# Patient Record
Sex: MALE | Race: WHITE | Employment: OTHER | URBAN - METROPOLITAN AREA
[De-identification: names, ages, dates, MRNs, and addresses within clinical notes are randomized per-mention and may not be internally consistent; named-entity substitution may affect disease eponyms.]

---

## 2017-08-17 DIAGNOSIS — Z12.5 PROSTATE CANCER SCREENING: Primary | ICD-10-CM

## 2017-09-08 ENCOUNTER — HOSPITAL ENCOUNTER (OUTPATIENT)
Age: 67
Discharge: HOME | End: 2017-09-08
Payer: MEDICARE

## 2017-09-08 DIAGNOSIS — Z12.5 PROSTATE CANCER SCREENING: ICD-10-CM

## 2017-09-08 LAB — PROSTATE SPECIFIC ANTIGEN: 0.8 UG/L

## 2017-09-08 PROCEDURE — G0103 PSA SCREENING: HCPCS

## 2017-09-08 PROCEDURE — 36415 COLL VENOUS BLD VENIPUNCTURE: CPT

## 2017-09-28 ENCOUNTER — OFFICE VISIT (OUTPATIENT)
Dept: UROLOGY | Age: 67
End: 2017-09-28
Payer: COMMERCIAL

## 2017-09-28 VITALS
DIASTOLIC BLOOD PRESSURE: 83 MMHG | HEART RATE: 59 BPM | TEMPERATURE: 97.7 F | BODY MASS INDEX: 27.76 KG/M2 | SYSTOLIC BLOOD PRESSURE: 149 MMHG | HEIGHT: 69 IN | WEIGHT: 187.4 LBS

## 2017-09-28 DIAGNOSIS — R35.1 NOCTURIA: ICD-10-CM

## 2017-09-28 DIAGNOSIS — N40.1 BENIGN NON-NODULAR PROSTATIC HYPERPLASIA WITH LOWER URINARY TRACT SYMPTOMS: Primary | ICD-10-CM

## 2017-09-28 PROCEDURE — 99213 OFFICE O/P EST LOW 20 MIN: CPT | Performed by: UROLOGY

## 2017-09-28 ASSESSMENT — ENCOUNTER SYMPTOMS
BACK PAIN: 0
WHEEZING: 0
VOMITING: 0
COLOR CHANGE: 0
NAUSEA: 0
EYE REDNESS: 0
EYE PAIN: 0
SHORTNESS OF BREATH: 0
ABDOMINAL PAIN: 0
COUGH: 0

## 2018-06-01 DIAGNOSIS — Z12.11 ENCOUNTER FOR SCREENING COLONOSCOPY: Primary | ICD-10-CM

## 2018-06-15 DIAGNOSIS — Z86.010 HISTORY OF COLON POLYPS: Primary | ICD-10-CM

## 2018-06-19 RX ORDER — POLYETHYLENE GLYCOL 3350 17 G/17G
POWDER, FOR SOLUTION ORAL
Qty: 255 G | Refills: 0 | Status: SHIPPED | OUTPATIENT
Start: 2018-06-19 | End: 2018-07-15

## 2018-06-27 ENCOUNTER — HOSPITAL ENCOUNTER (OUTPATIENT)
Age: 68
Setting detail: OUTPATIENT SURGERY
Discharge: HOME | End: 2018-06-27
Attending: INTERNAL MEDICINE | Admitting: INTERNAL MEDICINE
Payer: MEDICARE

## 2018-06-27 VITALS
HEART RATE: 58 BPM | TEMPERATURE: 98.1 F | OXYGEN SATURATION: 94 % | BODY MASS INDEX: 28.14 KG/M2 | RESPIRATION RATE: 14 BRPM | DIASTOLIC BLOOD PRESSURE: 65 MMHG | HEIGHT: 69 IN | WEIGHT: 190 LBS | SYSTOLIC BLOOD PRESSURE: 129 MMHG

## 2018-06-27 PROCEDURE — 7100000011 HC PHASE II RECOVERY - ADDTL 15 MIN: Performed by: INTERNAL MEDICINE

## 2018-06-27 PROCEDURE — 99152 MOD SED SAME PHYS/QHP 5/>YRS: CPT | Performed by: INTERNAL MEDICINE

## 2018-06-27 PROCEDURE — 6360000002 HC RX W HCPCS: Performed by: INTERNAL MEDICINE

## 2018-06-27 PROCEDURE — 3609010400 HC COLONOSCOPY POLYPECTOMY HOT BIOPSY: Performed by: INTERNAL MEDICINE

## 2018-06-27 PROCEDURE — 99153 MOD SED SAME PHYS/QHP EA: CPT | Performed by: INTERNAL MEDICINE

## 2018-06-27 PROCEDURE — 88305 TISSUE EXAM BY PATHOLOGIST: CPT

## 2018-06-27 PROCEDURE — 2580000003 HC RX 258: Performed by: INTERNAL MEDICINE

## 2018-06-27 PROCEDURE — 7100000010 HC PHASE II RECOVERY - FIRST 15 MIN: Performed by: INTERNAL MEDICINE

## 2018-06-27 RX ORDER — FENTANYL CITRATE 50 UG/ML
INJECTION, SOLUTION INTRAMUSCULAR; INTRAVENOUS PRN
Status: DISCONTINUED | OUTPATIENT
Start: 2018-06-27 | End: 2018-06-27 | Stop reason: HOSPADM

## 2018-06-27 RX ORDER — MIDAZOLAM HYDROCHLORIDE 1 MG/ML
INJECTION INTRAMUSCULAR; INTRAVENOUS PRN
Status: DISCONTINUED | OUTPATIENT
Start: 2018-06-27 | End: 2018-06-27 | Stop reason: HOSPADM

## 2018-06-27 RX ORDER — SODIUM CHLORIDE 9 MG/ML
INJECTION, SOLUTION INTRAVENOUS CONTINUOUS
Status: DISCONTINUED | OUTPATIENT
Start: 2018-06-27 | End: 2018-06-27 | Stop reason: HOSPADM

## 2018-06-27 RX ADMIN — SODIUM CHLORIDE: 9 INJECTION, SOLUTION INTRAVENOUS at 06:40

## 2018-06-27 ASSESSMENT — PAIN - FUNCTIONAL ASSESSMENT: PAIN_FUNCTIONAL_ASSESSMENT: 0-10

## 2018-06-27 ASSESSMENT — PAIN SCALES - GENERAL: PAINLEVEL_OUTOF10: 0

## 2018-06-28 LAB — SURGICAL PATHOLOGY REPORT: NORMAL

## 2018-07-12 ENCOUNTER — TELEPHONE (OUTPATIENT)
Dept: GASTROENTEROLOGY | Age: 68
End: 2018-07-12

## 2018-09-10 ENCOUNTER — HOSPITAL ENCOUNTER (OUTPATIENT)
Age: 68
Discharge: HOME | End: 2018-09-10
Payer: MEDICARE

## 2018-09-10 DIAGNOSIS — N40.1 BENIGN NON-NODULAR PROSTATIC HYPERPLASIA WITH LOWER URINARY TRACT SYMPTOMS: ICD-10-CM

## 2018-09-10 LAB — PROSTATE SPECIFIC ANTIGEN: 0.89 UG/L

## 2018-09-10 PROCEDURE — 84153 ASSAY OF PSA TOTAL: CPT

## 2018-09-10 PROCEDURE — 36415 COLL VENOUS BLD VENIPUNCTURE: CPT

## 2018-09-27 ENCOUNTER — OFFICE VISIT (OUTPATIENT)
Dept: UROLOGY | Age: 68
End: 2018-09-27
Payer: MEDICARE

## 2018-09-27 VITALS
HEIGHT: 69 IN | TEMPERATURE: 97.6 F | DIASTOLIC BLOOD PRESSURE: 80 MMHG | BODY MASS INDEX: 27.25 KG/M2 | HEART RATE: 52 BPM | WEIGHT: 184 LBS | SYSTOLIC BLOOD PRESSURE: 152 MMHG

## 2018-09-27 DIAGNOSIS — R35.1 NOCTURIA: ICD-10-CM

## 2018-09-27 DIAGNOSIS — N40.1 BENIGN PROSTATIC HYPERPLASIA WITH WEAK URINARY STREAM: Primary | ICD-10-CM

## 2018-09-27 DIAGNOSIS — R39.12 BENIGN PROSTATIC HYPERPLASIA WITH WEAK URINARY STREAM: Primary | ICD-10-CM

## 2018-09-27 PROCEDURE — 99214 OFFICE O/P EST MOD 30 MIN: CPT | Performed by: UROLOGY

## 2018-09-27 ASSESSMENT — ENCOUNTER SYMPTOMS
EYE REDNESS: 0
EYE PAIN: 0
ABDOMINAL PAIN: 0
COUGH: 0
COLOR CHANGE: 1
VOMITING: 0
NAUSEA: 0
SHORTNESS OF BREATH: 0
WHEEZING: 0
BACK PAIN: 0

## 2018-09-27 NOTE — PROGRESS NOTES
condition?: Mostly Satisfied    Last BUN and creatinine:  Lab Results   Component Value Date    BUN 17 11/04/2016     Lab Results   Component Value Date    CREATININE 0.84 11/04/2016       Additional Lab/Culture results: none    Imaging Reviewed during this Office Visit: none  (results were independently reviewed by physician and radiology report verified)    PAST MEDICAL, FAMILY AND SOCIAL HISTORY UPDATE:  Past Medical History:   Diagnosis Date    Hyperlipidemia     Vitiligo      Past Surgical History:   Procedure Laterality Date    COLONOSCOPY      COLONOSCOPY N/A 6/27/2018    COLONOSCOPY POLYPECTOMY HOT performed by Liliana Escobar MD at [address] [place] [address]      PRE-MALIGNANT / BENIGN SKIN LESION EXCISION       Family History   Problem Relation Age of Onset    Prostate Cancer Father      Outpatient Prescriptions Marked as Taking for the 9/27/18 encounter (Office Visit) with Kenan Resendiz MD   Medication Sig Dispense Refill    atorvastatin (LIPITOR) 40 MG tablet TAKE 1 TABLET BY MOUTH DAILY 90 tablet 3       Tetanus toxoids  History   Smoking Status    Former Smoker    Types: Cigarettes   Smokeless Tobacco    Never Used     (If patient a smoker, smoking cessation counseling offered)    History   Alcohol Use    0.0 oz/week     Comment: 12 pack 2 days a week       REVIEW OF SYSTEMS:  Review of Systems    Physical Exam:      Vitals:    09/27/18 1008   BP: (!) 152/80   Pulse:    Temp:      Body mass index is 27.17 kg/m². Patient is a 75 y.o. male in no acute distress and alert and oriented to person, place and time. Physical Exam  Constitutional: Patient in no acute distress. Neuro: Alert and oriented to person, place and time.   Psych: Mood normal, affect normal  Skin: No rash noted  HEENT: Head: Normocephalic and atraumatic  Conjunctivae and EOM are normal. Pupils are equal, round  Nose: Normal  Right External Ear: Normal; Left External Ear: Normal  Mouth: Mucosa

## 2018-10-22 ENCOUNTER — TELEPHONE (OUTPATIENT)
Dept: PHARMACY | Facility: CLINIC | Age: 68
End: 2018-10-22

## 2019-02-28 ENCOUNTER — TELEPHONE (OUTPATIENT)
Dept: PHARMACY | Facility: CLINIC | Age: 69
End: 2019-02-28

## 2019-03-11 ENCOUNTER — OFFICE VISIT (OUTPATIENT)
Dept: GASTROENTEROLOGY | Age: 69
End: 2019-03-11
Payer: MEDICARE

## 2019-03-11 VITALS
HEIGHT: 69 IN | BODY MASS INDEX: 27.99 KG/M2 | HEART RATE: 64 BPM | WEIGHT: 189 LBS | DIASTOLIC BLOOD PRESSURE: 82 MMHG | SYSTOLIC BLOOD PRESSURE: 141 MMHG

## 2019-03-11 DIAGNOSIS — R14.3 FLATUS: Primary | ICD-10-CM

## 2019-03-11 PROCEDURE — 99214 OFFICE O/P EST MOD 30 MIN: CPT | Performed by: INTERNAL MEDICINE

## 2019-03-11 ASSESSMENT — ENCOUNTER SYMPTOMS
BLOOD IN STOOL: 0
ANAL BLEEDING: 1
SINUS PAIN: 0
DIARRHEA: 0
SINUS PRESSURE: 0
CHEST TIGHTNESS: 0
WHEEZING: 0
EYE REDNESS: 0
ABDOMINAL DISTENTION: 0
VOMITING: 0
ABDOMINAL PAIN: 0
EYE PAIN: 0
CONSTIPATION: 0
NAUSEA: 0
TROUBLE SWALLOWING: 0
COUGH: 0
RECTAL PAIN: 0
SHORTNESS OF BREATH: 1
BACK PAIN: 0

## 2019-07-22 PROBLEM — L80 VITILIGO: Status: ACTIVE | Noted: 2019-07-22

## 2019-07-24 ENCOUNTER — HOSPITAL ENCOUNTER (OUTPATIENT)
Age: 69
Discharge: HOME | End: 2019-07-24
Payer: MEDICARE

## 2019-07-24 DIAGNOSIS — R04.0 EPISTAXIS: ICD-10-CM

## 2019-07-24 LAB
ABSOLUTE EOS #: 0.2 K/UL (ref 0–0.4)
ABSOLUTE IMMATURE GRANULOCYTE: ABNORMAL K/UL (ref 0–0.3)
ABSOLUTE LYMPH #: 1.7 K/UL (ref 1–4.8)
ABSOLUTE MONO #: 0.8 K/UL (ref 0.1–1.3)
ALBUMIN SERPL-MCNC: 4.3 G/DL (ref 3.5–5.2)
ALBUMIN/GLOBULIN RATIO: ABNORMAL (ref 1–2.5)
ALP BLD-CCNC: 72 U/L (ref 40–129)
ALT SERPL-CCNC: 32 U/L (ref 5–41)
ANION GAP SERPL CALCULATED.3IONS-SCNC: 12 MMOL/L (ref 9–17)
AST SERPL-CCNC: 23 U/L
BASOPHILS # BLD: 1 % (ref 0–2)
BASOPHILS ABSOLUTE: 0.1 K/UL (ref 0–0.2)
BILIRUB SERPL-MCNC: 0.63 MG/DL (ref 0.3–1.2)
BUN BLDV-MCNC: 20 MG/DL (ref 8–23)
BUN/CREAT BLD: ABNORMAL (ref 9–20)
CALCIUM SERPL-MCNC: 9.1 MG/DL (ref 8.6–10.4)
CHLORIDE BLD-SCNC: 102 MMOL/L (ref 98–107)
CO2: 24 MMOL/L (ref 20–31)
CREAT SERPL-MCNC: 0.71 MG/DL (ref 0.7–1.2)
DIFFERENTIAL TYPE: ABNORMAL
EOSINOPHILS RELATIVE PERCENT: 3 % (ref 0–4)
GFR AFRICAN AMERICAN: >60 ML/MIN
GFR NON-AFRICAN AMERICAN: >60 ML/MIN
GFR SERPL CREATININE-BSD FRML MDRD: ABNORMAL ML/MIN/{1.73_M2}
GFR SERPL CREATININE-BSD FRML MDRD: ABNORMAL ML/MIN/{1.73_M2}
GLUCOSE BLD-MCNC: 102 MG/DL (ref 70–99)
HCT VFR BLD CALC: 47.1 % (ref 41–53)
HEMOGLOBIN: 16 G/DL (ref 13.5–17.5)
IMMATURE GRANULOCYTES: ABNORMAL %
LYMPHOCYTES # BLD: 24 % (ref 24–44)
MCH RBC QN AUTO: 31.4 PG (ref 26–34)
MCHC RBC AUTO-ENTMCNC: 33.9 G/DL (ref 31–37)
MCV RBC AUTO: 92.5 FL (ref 80–100)
MONOCYTES # BLD: 12 % (ref 1–7)
NRBC AUTOMATED: ABNORMAL PER 100 WBC
PDW BLD-RTO: 14 % (ref 11.5–14.9)
PLATELET # BLD: 174 K/UL (ref 150–450)
PLATELET ESTIMATE: ABNORMAL
PMV BLD AUTO: 7.7 FL (ref 6–12)
POTASSIUM SERPL-SCNC: 4.2 MMOL/L (ref 3.7–5.3)
RBC # BLD: 5.09 M/UL (ref 4.5–5.9)
RBC # BLD: ABNORMAL 10*6/UL
SEG NEUTROPHILS: 60 % (ref 36–66)
SEGMENTED NEUTROPHILS ABSOLUTE COUNT: 4.3 K/UL (ref 1.3–9.1)
SODIUM BLD-SCNC: 138 MMOL/L (ref 135–144)
TOTAL PROTEIN: 7.3 G/DL (ref 6.4–8.3)
WBC # BLD: 7.1 K/UL (ref 3.5–11)
WBC # BLD: ABNORMAL 10*3/UL

## 2019-07-24 PROCEDURE — 80053 COMPREHEN METABOLIC PANEL: CPT

## 2019-07-24 PROCEDURE — 85025 COMPLETE CBC W/AUTO DIFF WBC: CPT

## 2019-07-24 PROCEDURE — 36415 COLL VENOUS BLD VENIPUNCTURE: CPT

## 2019-08-30 ENCOUNTER — HOSPITAL ENCOUNTER (OUTPATIENT)
Age: 69
Discharge: HOME | End: 2019-08-30
Payer: MEDICARE

## 2019-08-30 DIAGNOSIS — R39.12 BENIGN PROSTATIC HYPERPLASIA WITH WEAK URINARY STREAM: ICD-10-CM

## 2019-08-30 DIAGNOSIS — N40.1 BENIGN PROSTATIC HYPERPLASIA WITH WEAK URINARY STREAM: ICD-10-CM

## 2019-08-30 LAB — PROSTATE SPECIFIC ANTIGEN: 0.79 UG/L

## 2019-08-30 PROCEDURE — 84153 ASSAY OF PSA TOTAL: CPT

## 2019-08-30 PROCEDURE — 36415 COLL VENOUS BLD VENIPUNCTURE: CPT

## 2019-09-05 ENCOUNTER — OFFICE VISIT (OUTPATIENT)
Dept: UROLOGY | Age: 69
End: 2019-09-05
Payer: MEDICARE

## 2019-09-05 VITALS
HEIGHT: 69 IN | HEART RATE: 54 BPM | SYSTOLIC BLOOD PRESSURE: 138 MMHG | DIASTOLIC BLOOD PRESSURE: 81 MMHG | BODY MASS INDEX: 28.02 KG/M2 | TEMPERATURE: 97.4 F | WEIGHT: 189.15 LBS

## 2019-09-05 DIAGNOSIS — N40.1 HYPERTROPHY OF PROSTATE WITH URINARY OBSTRUCTION: Primary | ICD-10-CM

## 2019-09-05 DIAGNOSIS — R35.0 URINARY FREQUENCY: ICD-10-CM

## 2019-09-05 DIAGNOSIS — N39.41 URGE INCONTINENCE OF URINE: ICD-10-CM

## 2019-09-05 DIAGNOSIS — R39.15 URGENCY OF URINATION: ICD-10-CM

## 2019-09-05 DIAGNOSIS — N13.8 HYPERTROPHY OF PROSTATE WITH URINARY OBSTRUCTION: Primary | ICD-10-CM

## 2019-09-05 PROCEDURE — 99214 OFFICE O/P EST MOD 30 MIN: CPT | Performed by: UROLOGY

## 2019-09-05 RX ORDER — TAMSULOSIN HYDROCHLORIDE 0.4 MG/1
0.4 CAPSULE ORAL DAILY
Qty: 90 CAPSULE | Refills: 3 | Status: SHIPPED | OUTPATIENT
Start: 2019-09-05 | End: 2019-12-16 | Stop reason: SDUPTHER

## 2019-09-05 NOTE — PROGRESS NOTES
[facility]  [facility] - [facility] [address] [address]  Dept: [address] [facility] Urology Office Note - Established    Patient:  Ricardo Aguila  YOB: 1950  Date: 2019    The patient is a 70 y.o. male who presents todayfor evaluation of the following problems:   Chief Complaint   Patient presents with    Other     1 year with PSA        HPI  Here for follow up on BPH. He is currently not taking any medications. He notices his stream is weak, has urgency, he has no nocturia. His Dad  of prostate cancer, and his Paternal Bernie Win also  of prostate cancer. He drinks alcohol routinely- he is using depends if he plans to drink a lot. Summary of old records: N/A    Additional History: N/A    Procedures Today: N/A    Urinalysis today:  No results found for this visit on 19.   Last several PSA's:  Lab Results   Component Value Date    PSA 0.79 2019    PSA 0.89 09/10/2018    PSA 0.80 2017     Last total testosterone:  Lab Results   Component Value Date    TESTOSTERONE 367 2013       AUA Symptom Score (2019):  INCOMPLETE EMPTYING: How often have you had the sensation of not emptying your bladder?: About Half the time  FREQUENCY: How often do you have to urinate less than every two hours?: Not at all  INTERMITTENCY: How often have you found you stopped and started again several times when you urinated?: Not at all  URGENCY: How often have you found it difficult to postpone urination?: Less than Half the time  WEAK STREAM: How often have you had a weak urinary stream?: Less than 1 to 5 times  STRAINING: How often have you had to strain to start  urination?: Not at all  NOCTURIA: How many times did you typically get up at night to uriniate?: NONE  TOTAL I-PSS SCORE[de-identified] 6  How would you feel if you were to spend the rest of your life with your urinary condition?: Delighted    Last BUN and creatinine:  Lab Results normal  Skin: No rash noted  HEENT: Head: Normocephalic andatraumatic  Conjunctivae and EOM are normal. Pupils are equal, round  Nose:Normal  Right External Ear: Normal; Left External Ear: Normal  Mouth: Mucosa Moist  Neck: Supple  Lungs: Respiratory effort is normal  Cardiovascular: Warm & Pink  Abdomen: Soft, non-tender, non-distended with no CVA,  No flank tenderness,  Or hepatosplenomegaly       Assessment and Plan      1. Hypertrophy of prostate with urinary obstruction    2. Urgency of urination    3. Urinary frequency    4. Urge incontinence of urine           Plan:     trial of flomax  Return in about 3 months (around 12/5/2019) for Follow up. Prescriptions Ordered:  Orders Placed This Encounter   Medications    tamsulosin (FLOMAX) 0.4 MG capsule     Sig: Take 1 capsule by mouth daily     Dispense:  90 capsule     Refill:  3     Orders Placed:  No orders of the defined types were placed in this encounter. Fabiano Deluca MD    Agree with the ROS entered by the MA.

## 2019-12-05 ENCOUNTER — OFFICE VISIT (OUTPATIENT)
Dept: UROLOGY | Age: 69
End: 2019-12-05
Payer: MEDICARE

## 2019-12-05 VITALS
WEIGHT: 203 LBS | BODY MASS INDEX: 30.07 KG/M2 | HEART RATE: 60 BPM | HEIGHT: 69 IN | DIASTOLIC BLOOD PRESSURE: 62 MMHG | SYSTOLIC BLOOD PRESSURE: 146 MMHG

## 2019-12-05 DIAGNOSIS — R39.14 BENIGN PROSTATIC HYPERPLASIA WITH INCOMPLETE BLADDER EMPTYING: Primary | ICD-10-CM

## 2019-12-05 DIAGNOSIS — N32.81 OAB (OVERACTIVE BLADDER): ICD-10-CM

## 2019-12-05 DIAGNOSIS — N40.1 BENIGN PROSTATIC HYPERPLASIA WITH INCOMPLETE BLADDER EMPTYING: Primary | ICD-10-CM

## 2019-12-05 PROCEDURE — 99213 OFFICE O/P EST LOW 20 MIN: CPT | Performed by: UROLOGY

## 2019-12-05 ASSESSMENT — ENCOUNTER SYMPTOMS
ABDOMINAL PAIN: 0
COUGH: 0
EYE REDNESS: 0
BACK PAIN: 0
CONSTIPATION: 0
VOMITING: 0
WHEEZING: 0
SHORTNESS OF BREATH: 0
EYE PAIN: 0
NAUSEA: 0
DIARRHEA: 0

## 2019-12-16 RX ORDER — TAMSULOSIN HYDROCHLORIDE 0.4 MG/1
0.4 CAPSULE ORAL DAILY
Qty: 90 CAPSULE | Refills: 3 | Status: SHIPPED | OUTPATIENT
Start: 2019-12-16 | End: 2020-06-01 | Stop reason: SDUPTHER

## 2020-02-24 ENCOUNTER — TELEPHONE (OUTPATIENT)
Dept: ONCOLOGY | Age: 70
End: 2020-02-24

## 2020-02-29 ENCOUNTER — HOSPITAL ENCOUNTER (OUTPATIENT)
Age: 70
Discharge: HOME | End: 2020-02-29
Payer: MEDICARE

## 2020-02-29 ENCOUNTER — HOSPITAL ENCOUNTER (OUTPATIENT)
Dept: CT IMAGING | Age: 70
Discharge: HOME | End: 2020-03-02
Payer: MEDICARE

## 2020-02-29 LAB
CHOLESTEROL/HDL RATIO: 4
CHOLESTEROL: 140 MG/DL
GLUCOSE FASTING: 125 MG/DL (ref 70–99)
HDLC SERPL-MCNC: 35 MG/DL
LDL CHOLESTEROL: 78 MG/DL (ref 0–130)
TRIGL SERPL-MCNC: 134 MG/DL
VLDLC SERPL CALC-MCNC: ABNORMAL MG/DL (ref 1–30)

## 2020-02-29 PROCEDURE — G0297 LDCT FOR LUNG CA SCREEN: HCPCS

## 2020-02-29 PROCEDURE — 82947 ASSAY GLUCOSE BLOOD QUANT: CPT

## 2020-02-29 PROCEDURE — 80061 LIPID PANEL: CPT

## 2020-02-29 PROCEDURE — 36415 COLL VENOUS BLD VENIPUNCTURE: CPT

## 2020-03-03 PROBLEM — N40.1 BENIGN PROSTATIC HYPERPLASIA WITH URINARY OBSTRUCTION: Status: ACTIVE | Noted: 2020-03-03

## 2020-03-03 PROBLEM — N13.8 BENIGN PROSTATIC HYPERPLASIA WITH URINARY OBSTRUCTION: Status: ACTIVE | Noted: 2020-03-03

## 2020-03-30 ENCOUNTER — TELEPHONE (OUTPATIENT)
Dept: CASE MANAGEMENT | Age: 70
End: 2020-03-30

## 2020-03-30 NOTE — TELEPHONE ENCOUNTER
Lung Navigator reviewing recent Lung Screening and screen reviewed per provider. Pt. Needing 3 month LDCT, plan to follow.

## 2020-05-26 ENCOUNTER — TELEPHONE (OUTPATIENT)
Dept: ONCOLOGY | Age: 70
End: 2020-05-26

## 2020-05-28 ENCOUNTER — HOSPITAL ENCOUNTER (OUTPATIENT)
Dept: PULMONOLOGY | Age: 70
Discharge: HOME | End: 2020-05-28
Payer: MEDICARE

## 2020-05-28 ENCOUNTER — HOSPITAL ENCOUNTER (OUTPATIENT)
Age: 70
Discharge: HOME | End: 2020-05-28
Payer: MEDICARE

## 2020-05-28 PROCEDURE — 93005 ELECTROCARDIOGRAM TRACING: CPT | Performed by: NURSE PRACTITIONER

## 2020-05-28 PROCEDURE — 94726 PLETHYSMOGRAPHY LUNG VOLUMES: CPT

## 2020-05-28 PROCEDURE — 94060 EVALUATION OF WHEEZING: CPT

## 2020-05-28 PROCEDURE — 94729 DIFFUSING CAPACITY: CPT

## 2020-05-28 PROCEDURE — 6360000002 HC RX W HCPCS: Performed by: NURSE PRACTITIONER

## 2020-05-28 RX ORDER — ALBUTEROL SULFATE 2.5 MG/3ML
2.5 SOLUTION RESPIRATORY (INHALATION) ONCE
Status: COMPLETED | OUTPATIENT
Start: 2020-05-28 | End: 2020-05-28

## 2020-05-28 RX ADMIN — ALBUTEROL SULFATE 2.5 MG: 2.5 SOLUTION RESPIRATORY (INHALATION) at 11:51

## 2020-05-29 LAB
EKG ATRIAL RATE: 60 BPM
EKG P AXIS: 65 DEGREES
EKG P-R INTERVAL: 180 MS
EKG Q-T INTERVAL: 424 MS
EKG QRS DURATION: 102 MS
EKG QTC CALCULATION (BAZETT): 424 MS
EKG R AXIS: 51 DEGREES
EKG T AXIS: 64 DEGREES
EKG VENTRICULAR RATE: 60 BPM

## 2020-05-29 PROCEDURE — 93010 ELECTROCARDIOGRAM REPORT: CPT | Performed by: INTERNAL MEDICINE

## 2020-06-05 RX ORDER — TAMSULOSIN HYDROCHLORIDE 0.4 MG/1
0.4 CAPSULE ORAL DAILY
Qty: 90 CAPSULE | Refills: 3 | Status: SHIPPED | OUTPATIENT
Start: 2020-06-05 | End: 2020-12-10 | Stop reason: SDUPTHER

## 2020-06-10 ENCOUNTER — HOSPITAL ENCOUNTER (OUTPATIENT)
Dept: CT IMAGING | Age: 70
Discharge: HOME | End: 2020-06-12
Payer: MEDICARE

## 2020-06-10 PROCEDURE — 71250 CT THORAX DX C-: CPT

## 2020-06-22 ENCOUNTER — TELEPHONE (OUTPATIENT)
Dept: UROLOGY | Age: 70
End: 2020-06-22

## 2020-06-23 ENCOUNTER — HOSPITAL ENCOUNTER (OUTPATIENT)
Age: 70
Discharge: HOME | End: 2020-06-23
Payer: MEDICARE

## 2020-06-23 LAB — PROSTATE SPECIFIC ANTIGEN: 1.2 UG/L

## 2020-06-23 PROCEDURE — 36415 COLL VENOUS BLD VENIPUNCTURE: CPT

## 2020-06-23 PROCEDURE — 84153 ASSAY OF PSA TOTAL: CPT

## 2020-06-25 ENCOUNTER — OFFICE VISIT (OUTPATIENT)
Dept: UROLOGY | Age: 70
End: 2020-06-25
Payer: MEDICARE

## 2020-06-25 VITALS — WEIGHT: 197.2 LBS | BODY MASS INDEX: 28.7 KG/M2 | TEMPERATURE: 98.4 F

## 2020-06-25 PROCEDURE — 99214 OFFICE O/P EST MOD 30 MIN: CPT | Performed by: UROLOGY

## 2020-06-25 ASSESSMENT — ENCOUNTER SYMPTOMS
SHORTNESS OF BREATH: 0
WHEEZING: 0
ABDOMINAL PAIN: 0
NAUSEA: 0
COLOR CHANGE: 0
EYE PAIN: 0
EYE REDNESS: 0
VOMITING: 0
BACK PAIN: 0
COUGH: 0

## 2020-06-25 NOTE — PROGRESS NOTES
independently reviewed by physician and radiology report verified)    PAST MEDICAL, FAMILY AND SOCIAL HISTORY UPDATE:  Past Medical History:   Diagnosis Date    Hyperlipidemia     Vitiligo      Past Surgical History:   Procedure Laterality Date    COLONOSCOPY      COLONOSCOPY N/A 2018    COLONOSCOPY POLYPECTOMY HOT performed by Carlito Cobos MD at [facility]      PRE-MALIGNANT / BENIGN SKIN LESION EXCISION       Family History   Problem Relation Age of Onset    Prostate Cancer Father      Outpatient Medications Marked as Taking for the 20 encounter (Office Visit) with Navi Nicolas MD   Medication Sig Dispense Refill    hydrochlorothiazide (HYDRODIURIL) 12.5 MG tablet TAKE 1 TABLET BY MOUTH DAILY with 100 mg Losartan 30 tablet 3    tamsulosin (FLOMAX) 0.4 MG capsule Take 1 capsule by mouth daily 90 capsule 3    losartan-hydrochlorothiazide (HYZAAR) 100-12.5 MG per tablet Take 1 tablet by mouth daily 90 tablet 0    losartan (COZAAR) 100 MG tablet TAKE 1 TABLET BY MOUTH DAILY (TAKE WITH HYDROCHLOROTHIAZIDE 12.5 MG TABLETS)      albuterol sulfate HFA (PROVENTIL HFA) 108 (90 Base) MCG/ACT inhaler Inhale 2 puffs into the lungs every 6 hours as needed for Wheezing 1 Inhaler 3    atorvastatin (LIPITOR) 40 MG tablet TAKE 1 TABLET BY MOUTH DAILY 90 tablet 3       Tetanus toxoids  Social History     Tobacco Use   Smoking Status Former Smoker    Packs/day: 1.00    Years: 49.00    Pack years: 49.00    Types: Cigarettes    Last attempt to quit:     Years since quittin.4   Smokeless Tobacco Never Used     (Ifpatient a smoker, smoking cessation counseling offered)    Social History     Substance and Sexual Activity   Alcohol Use Yes    Alcohol/week: 0.0 standard drinks    Comment: 12 pack 2 days a week       REVIEW OF SYSTEMS:  Review of Systems    Physical Exam:      Vitals:    20 0817   Temp: 98.4 °F (36.9 °C)     Body mass index is 28.7 kg/m². Patient is a 78 y.o. male in no acute distress and alert and oriented to person, place and time. Physical Exam  Constitutional: Patient in no acute distress. Neuro: Alert and oriented to person, place and time. Psych: Mood normal, affect normal  Skin: warm pink,  No rash noted  HEENT: Head: Normocephalic andatraumatic  Conjunctivae and EOM are normal. Pupils are equal, round  Nose:Normal  Right External Ear: Normal; Left External Ear: Normal  Mouth: Mucosa Moist  Neck: Supple  Lungs: Respiratory effort is normal  Cardiovascular: Warm & Pink  Abdomen: Soft, non-tender, non-distended  Bladder non-tender and not distended. Musculoskeletal: Normal gait and station  Prostate: small nodule on right side    Assessment and Plan      1. Benign prostatic hyperplasia with incomplete bladder emptying    2. Urgency of urination    3. Family history of prostate cancer in father    3. Prostate nodule           Plan:       Return in about 6 weeks (around 8/6/2020) for Follow up. Prescriptions Ordered:  No orders of the defined types were placed in this encounter. Orders Placed:  Orders Placed This Encounter   Procedures    MRI PROSTATE W WO CONTRAST     Standing Status:   Future     Standing Expiration Date:   6/25/2021    PSA, Diagnostic     Standing Status:   Future     Standing Expiration Date:   6/25/2021           Eden Lawrence MD    Agree with the ROS entered by the MA.

## 2020-07-13 ENCOUNTER — HOSPITAL ENCOUNTER (OUTPATIENT)
Dept: MRI IMAGING | Age: 70
Discharge: HOME | End: 2020-07-15
Payer: MEDICARE

## 2020-07-13 LAB
CREAT SERPL-MCNC: 0.95 MG/DL (ref 0.7–1.2)
GFR AFRICAN AMERICAN: >60 ML/MIN
GFR NON-AFRICAN AMERICAN: >60 ML/MIN
GFR SERPL CREATININE-BSD FRML MDRD: NORMAL ML/MIN/{1.73_M2}
GFR SERPL CREATININE-BSD FRML MDRD: NORMAL ML/MIN/{1.73_M2}

## 2020-07-13 PROCEDURE — 2580000003 HC RX 258: Performed by: UROLOGY

## 2020-07-13 PROCEDURE — A9579 GAD-BASE MR CONTRAST NOS,1ML: HCPCS | Performed by: UROLOGY

## 2020-07-13 PROCEDURE — 82565 ASSAY OF CREATININE: CPT

## 2020-07-13 PROCEDURE — 72197 MRI PELVIS W/O & W/DYE: CPT

## 2020-07-13 PROCEDURE — 6360000004 HC RX CONTRAST MEDICATION: Performed by: UROLOGY

## 2020-07-13 RX ORDER — 0.9 % SODIUM CHLORIDE 0.9 %
50 INTRAVENOUS SOLUTION INTRAVENOUS ONCE
Status: COMPLETED | OUTPATIENT
Start: 2020-07-13 | End: 2020-07-13

## 2020-07-13 RX ORDER — SODIUM CHLORIDE 0.9 % (FLUSH) 0.9 %
10 SYRINGE (ML) INJECTION PRN
Status: DISCONTINUED | OUTPATIENT
Start: 2020-07-13 | End: 2020-07-16 | Stop reason: HOSPADM

## 2020-07-13 RX ADMIN — GADOTERIDOL 20 ML: 279.3 INJECTION, SOLUTION INTRAVENOUS at 08:19

## 2020-07-13 RX ADMIN — SODIUM CHLORIDE 50 ML: 9 INJECTION, SOLUTION INTRAVENOUS at 08:19

## 2020-07-13 RX ADMIN — Medication 10 ML: at 08:19

## 2020-07-21 ENCOUNTER — TELEPHONE (OUTPATIENT)
Dept: UROLOGY | Age: 70
End: 2020-07-21

## 2020-08-26 ENCOUNTER — TELEPHONE (OUTPATIENT)
Dept: UROLOGY | Age: 70
End: 2020-08-26

## 2020-08-27 ENCOUNTER — OFFICE VISIT (OUTPATIENT)
Dept: UROLOGY | Age: 70
End: 2020-08-27
Payer: MEDICARE

## 2020-08-27 ENCOUNTER — HOSPITAL ENCOUNTER (OUTPATIENT)
Age: 70
Discharge: HOME | End: 2020-08-27
Payer: MEDICARE

## 2020-08-27 VITALS — TEMPERATURE: 97.8 F

## 2020-08-27 LAB — PROSTATE SPECIFIC ANTIGEN: 1.09 UG/L

## 2020-08-27 PROCEDURE — 36415 COLL VENOUS BLD VENIPUNCTURE: CPT

## 2020-08-27 PROCEDURE — 84153 ASSAY OF PSA TOTAL: CPT

## 2020-08-27 PROCEDURE — 99214 OFFICE O/P EST MOD 30 MIN: CPT | Performed by: UROLOGY

## 2020-08-27 ASSESSMENT — ENCOUNTER SYMPTOMS
EYE REDNESS: 0
EYE PAIN: 0
ABDOMINAL PAIN: 0
NAUSEA: 0
VOMITING: 0
WHEEZING: 0
COLOR CHANGE: 0
COUGH: 0
SHORTNESS OF BREATH: 0
BACK PAIN: 0

## 2020-08-27 NOTE — PROGRESS NOTES
[address] [address]  Dept:  [facility] Urology Office Note - Established    Patient:  Landon Watters  YOB: 1950  Date: 8/27/2020    The patient is a 78 y.o. male who presents todayfor evaluation of the following problems:   Chief Complaint   Patient presents with    Elevated PSA     6 wk f/u with prostate MRI, PSA completed this morning        HPI  Here for follow up on MRI of the prostate and PSA. MRI shows priads 4 lesion. He has a prostate nodule with normal PSA. He urinates ok, decent stream, feels that he empties bladder no issues    Summary of old records: N/A    Additional History: N/A    Procedures Today: N/A    Urinalysis today:  No results found for this visit on 08/27/20.   Last several PSA's:  Lab Results   Component Value Date    PSA 1.20 06/23/2020    PSA 0.79 08/30/2019    PSA 0.89 09/10/2018     Last total testosterone:  Lab Results   Component Value Date    TESTOSTERONE 367 07/22/2013       AUA Symptom Score (8/27/2020):                               Last BUN and creatinine:  Lab Results   Component Value Date    BUN 20 07/24/2019     Lab Results   Component Value Date    CREATININE 0.95 07/13/2020       Additional Lab/Culture results: none    Imaging Reviewed during this Office Visit: none  (results were independently reviewed by physician and radiology report verified)    PAST MEDICAL, FAMILY AND SOCIAL HISTORY UPDATE:  Past Medical History:   Diagnosis Date    Hyperlipidemia     Vitiligo      Past Surgical History:   Procedure Laterality Date    COLONOSCOPY      COLONOSCOPY N/A 6/27/2018    COLONOSCOPY POLYPECTOMY HOT performed by Emily Tapia MD at [address] [place] [address]      PRE-MALIGNANT / BENIGN SKIN LESION EXCISION       Family History   Problem Relation Age of Onset    Prostate Cancer Father      Outpatient Medications Marked as Taking for the 20 encounter (Office Visit) with Bhavana Diaz MD   Medication Sig Dispense Refill    losartan (COZAAR) 100 MG tablet TAKE 1 TABLET BY MOUTH DAILY (TAKE WITH HYDROCHLOROTHIAZIDE 12.5 MG TABLETS) 90 tablet 3    hydrochlorothiazide (HYDRODIURIL) 12.5 MG tablet TAKE 1 TABLET BY MOUTH DAILY with 100 mg Losartan 30 tablet 3    tamsulosin (FLOMAX) 0.4 MG capsule Take 1 capsule by mouth daily 90 capsule 3    albuterol sulfate HFA (PROVENTIL HFA) 108 (90 Base) MCG/ACT inhaler Inhale 2 puffs into the lungs every 6 hours as needed for Wheezing 1 Inhaler 3    atorvastatin (LIPITOR) 40 MG tablet TAKE 1 TABLET BY MOUTH DAILY 90 tablet 3       Tetanus toxoids  Social History     Tobacco Use   Smoking Status Former Smoker    Packs/day: 1.00    Years: 49.00    Pack years: 49.00    Types: Cigarettes    Last attempt to quit:     Years since quittin.6   Smokeless Tobacco Never Used     (Ifpatient a smoker, smoking cessation counseling offered)    Social History     Substance and Sexual Activity   Alcohol Use Yes    Alcohol/week: 0.0 standard drinks    Comment: 12 pack 2 days a week       REVIEW OF SYSTEMS:  Review of Systems    Physical Exam:      Vitals:    20 0939   Temp: 97.8 °F (36.6 °C)     There is no height or weight on file to calculate BMI. Patient is a 78 y.o. male in no acute distress and alert and oriented to person, place and time. Physical Exam  Constitutional: Patient in no acute distress. Neuro: Alert and oriented to person, place and time.   Psych: Mood normal, affect normal  Skin: No rash noted  HEENT: Head: Normocephalic andatraumatic  Conjunctivae and EOM are normal. Pupils are equal, round  Nose:Normal  Right External Ear: Normal; Left External Ear: Normal  Mouth: Mucosa Moist  Neck: Supple  Lungs: Respiratory effort is normal  Cardiovascular: Warm & Pink  Abdomen: Soft, non-tender, non-distended with no CVA,  No flank tenderness,  Or hepatosplenomegaly Assessment and Plan      1. Benign prostatic hyperplasia with incomplete bladder emptying    2. Elevated PSA    3. Prostate nodule           Plan:     trus prostate biopsy at [facility] with jason herrmann  Return for Surgery. Prescriptions Ordered:  No orders of the defined types were placed in this encounter. Orders Placed:  No orders of the defined types were placed in this encounter. Isabella Tabor MD    Agree with the ROS entered by the MA.

## 2020-09-21 ENCOUNTER — TELEPHONE (OUTPATIENT)
Dept: UROLOGY | Age: 70
End: 2020-09-21

## 2020-09-21 RX ORDER — CIPROFLOXACIN 500 MG/1
500 TABLET, FILM COATED ORAL 2 TIMES DAILY
Qty: 8 TABLET | Refills: 0 | Status: CANCELLED | OUTPATIENT
Start: 2020-09-21 | End: 2020-09-25

## 2020-09-21 NOTE — TELEPHONE ENCOUNTER
Prostate BX & US @ [facility] 10/06/20 10:45am **STOP BLOOD THINNER 9/29/20**   PAT @ [facility] 9/29/20 9:30am   COVID-19 @ [facility] 10/02/20 11:30am         Spoke with patient, procedure info emailed.

## 2020-09-22 RX ORDER — CIPROFLOXACIN 500 MG/1
500 TABLET, FILM COATED ORAL 2 TIMES DAILY
Qty: 8 TABLET | Refills: 0 | Status: SHIPPED | OUTPATIENT
Start: 2020-09-22 | End: 2020-09-26

## 2020-09-29 ENCOUNTER — HOSPITAL ENCOUNTER (OUTPATIENT)
Dept: PREADMISSION TESTING | Age: 70
Discharge: HOME | End: 2020-10-03
Payer: MEDICARE

## 2020-09-29 VITALS
SYSTOLIC BLOOD PRESSURE: 138 MMHG | OXYGEN SATURATION: 94 % | HEART RATE: 60 BPM | DIASTOLIC BLOOD PRESSURE: 78 MMHG | TEMPERATURE: 98.5 F | WEIGHT: 195.13 LBS | RESPIRATION RATE: 16 BRPM | HEIGHT: 69 IN | BODY MASS INDEX: 28.9 KG/M2

## 2020-09-29 LAB
ANION GAP SERPL CALCULATED.3IONS-SCNC: 11 MMOL/L (ref 9–17)
BUN BLDV-MCNC: 23 MG/DL (ref 8–23)
CHLORIDE BLD-SCNC: 103 MMOL/L (ref 98–107)
CO2: 25 MMOL/L (ref 20–31)
CREAT SERPL-MCNC: 0.9 MG/DL (ref 0.7–1.2)
GFR AFRICAN AMERICAN: >60 ML/MIN
GFR NON-AFRICAN AMERICAN: >60 ML/MIN
GFR SERPL CREATININE-BSD FRML MDRD: NORMAL ML/MIN/{1.73_M2}
GFR SERPL CREATININE-BSD FRML MDRD: NORMAL ML/MIN/{1.73_M2}
GLUCOSE BLD-MCNC: 129 MG/DL (ref 70–99)
HCT VFR BLD CALC: 48.8 % (ref 40.7–50.3)
HEMOGLOBIN: 16.2 G/DL (ref 13–17)
POTASSIUM SERPL-SCNC: 4.6 MMOL/L (ref 3.7–5.3)
SODIUM BLD-SCNC: 139 MMOL/L (ref 135–144)

## 2020-09-29 PROCEDURE — 85014 HEMATOCRIT: CPT

## 2020-09-29 PROCEDURE — 85018 HEMOGLOBIN: CPT

## 2020-09-29 PROCEDURE — 82947 ASSAY GLUCOSE BLOOD QUANT: CPT

## 2020-09-29 PROCEDURE — 84520 ASSAY OF UREA NITROGEN: CPT

## 2020-09-29 PROCEDURE — 36415 COLL VENOUS BLD VENIPUNCTURE: CPT

## 2020-09-29 PROCEDURE — 80051 ELECTROLYTE PANEL: CPT

## 2020-09-29 PROCEDURE — 82565 ASSAY OF CREATININE: CPT

## 2020-09-29 PROCEDURE — 87086 URINE CULTURE/COLONY COUNT: CPT

## 2020-09-29 RX ORDER — SODIUM CHLORIDE, SODIUM LACTATE, POTASSIUM CHLORIDE, CALCIUM CHLORIDE 600; 310; 30; 20 MG/100ML; MG/100ML; MG/100ML; MG/100ML
1000 INJECTION, SOLUTION INTRAVENOUS CONTINUOUS
Status: CANCELLED | OUTPATIENT
Start: 2020-09-29

## 2020-09-29 NOTE — H&P
History and Physical    Pt Name: Yasmani Meredith  MRN: [id number]  YOB: 1950  Date of evaluation: 9/29/2020    SUBJECTIVE:   History of Chief Complaint:    Patient presents for PAT for prostate biopsy with US. He had a SULY in the office, which detected an abnormality per provider. Patient denies any urologic symptoms. He has been scheduled for prostate biopsy with US, his first biopsy. Past Medical History    has a past medical history of Asthma, Chronic bronchitis ([address] COPD (chronic obstructive pulmonary disease) ([address] Diverticulosis, GERD (gastroesophageal reflux disease), [place] (hard of hearing), Hyperlipidemia, Hypertension, Pulmonary nodule, Snores, Vitiligo, Wears glasses, and Wears partial dentures. Past Surgical History   has a past surgical history that includes Colonoscopy; cyst removal; pre-malignant / benign skin lesion excision; Colonoscopy (N/A, 6/27/2018); Cataract removal (Bilateral); and Cardiac catheterization (1990s). Medications  Prior to Admission medications    Medication Sig Start Date End Date Taking?  Authorizing Provider   losartan (COZAAR) 100 MG tablet TAKE 1 TABLET BY MOUTH DAILY (TAKE WITH HYDROCHLOROTHIAZIDE 12.5 MG TABLETS)  Patient taking differently: Take 100 mg by mouth daily  7/16/20  Yes JARRED Mata NP   hydrochlorothiazide (HYDRODIURIL) 12.5 MG tablet TAKE 1 TABLET BY MOUTH DAILY with 100 mg Losartan  Patient taking differently: Take 12.5 mg by mouth daily TAKE 1 TABLET BY MOUTH DAILY with 100 mg Losartan 6/24/20  Yes JARRED Mata NP   tamsulosin (FLOMAX) 0.4 MG capsule Take 1 capsule by mouth daily  Patient taking differently: Take 0.4 mg by mouth nightly  6/5/20  Yes Nestor Perrin MD   albuterol sulfate HFA (PROVENTIL HFA) 108 (90 Base) MCG/ACT inhaler Inhale 2 puffs into the lungs every 6 hours as needed for Wheezing 3/17/20  Yes JARRED Miller NP   atorvastatin (LIPITOR) 40 MG tablet TAKE 1 TABLET BY MOUTH DAILY  Patient taking differently: Take 40 mg by mouth nightly TAKE 1 TABLET BY MOUTH DAILY 3/11/20  Yes Maryetta Seip, MD     Allergies  is allergic to tetanus toxoids. Family History  family history includes Lung Cancer in his mother; Other in his sister; Prostate Cancer in his father. Social History   reports that he quit smoking about 6 years ago. His smoking use included cigarettes. He has a 49.00 pack-year smoking history. He has never used smokeless tobacco.  1 PPD for 49 years, quit 2014. reports current alcohol use. Weekly. reports no history of drug use. Marital Status   Occupation works for [employer]. OBJECTIVE:   VITALS:  height is 5' 9\" (1.753 m) and weight is 195 lb 2 oz (88.5 kg). His temporal temperature is 98.5 °F (36.9 °C). His blood pressure is 138/78 and his pulse is 60. His respiration is 16 and oxygen saturation is 94%. CONSTITUTIONAL:alert & oriented x 3, no acute distress. Very pleasant. SKIN:  Warm and dry, no rashes on exposed areas of skin. Vitiligo noted BUE and BLE, facial, areas of hypopigmentation. HEAD:  Normocephalic, atraumatic   EYES: PERRL. EOMs intact. EARS:  Hearing loss mild. NOSE:  Nares patent. No rhinorrhea. MOUTH/THROAT:  Lower partial denture  NECK:supple, no lymphadenopathy  LUNGS: Clear to auscultation bilaterally, no wheezes. CARDIOVASCULAR: Heart sounds are normal.  Regular rate (very mildly bradycardic) and rhythm. ABDOMEN: soft, non tender, non distended. EXTREMITIES: no edema bilateral lower extremities. Testing:   EKG: in epic/chart 5/2020  Labs pending: drawn 9/29/2020     IMPRESSIONS:   Abnormal SULY   has a past medical history of Asthma, Chronic bronchitis ([address] COPD (chronic obstructive pulmonary disease) ([address] Diverticulosis, GERD (gastroesophageal reflux disease), [place] (hard of hearing), Hyperlipidemia, Hypertension, Pulmonary nodule, Snores, Vitiligo, Wears glasses, and Wears partial dentures.    PLANS:   Prostate biopsy    ANDREINA VALENTE TRUNG BLUNT  Electronically signed 9/29/2020 at 10:04 AM

## 2020-09-29 NOTE — H&P (VIEW-ONLY)
History and Physical    Pt Name: Nahomy Chapman  MRN: [id number]  YOB: 1950  Date of evaluation: 9/29/2020    SUBJECTIVE:   History of Chief Complaint:    Patient presents for PAT for prostate biopsy with US. He had a SULY in the office, which detected an abnormality per provider. Patient denies any urologic symptoms. He has been scheduled for prostate biopsy with US, his first biopsy. Past Medical History    has a past medical history of Asthma, Chronic bronchitis ([address] COPD (chronic obstructive pulmonary disease) ([address] Diverticulosis, GERD (gastroesophageal reflux disease), [place] (hard of hearing), Hyperlipidemia, Hypertension, Pulmonary nodule, Snores, Vitiligo, Wears glasses, and Wears partial dentures. Past Surgical History   has a past surgical history that includes Colonoscopy; cyst removal; pre-malignant / benign skin lesion excision; Colonoscopy (N/A, 6/27/2018); Cataract removal (Bilateral); and Cardiac catheterization (1990s). Medications  Prior to Admission medications    Medication Sig Start Date End Date Taking?  Authorizing Provider   losartan (COZAAR) 100 MG tablet TAKE 1 TABLET BY MOUTH DAILY (TAKE WITH HYDROCHLOROTHIAZIDE 12.5 MG TABLETS)  Patient taking differently: Take 100 mg by mouth daily  7/16/20  Yes JARRED Mata NP   hydrochlorothiazide (HYDRODIURIL) 12.5 MG tablet TAKE 1 TABLET BY MOUTH DAILY with 100 mg Losartan  Patient taking differently: Take 12.5 mg by mouth daily TAKE 1 TABLET BY MOUTH DAILY with 100 mg Losartan 6/24/20  Yes JARRED Mata NP   tamsulosin (FLOMAX) 0.4 MG capsule Take 1 capsule by mouth daily  Patient taking differently: Take 0.4 mg by mouth nightly  6/5/20  Yes Julienne Ziegler MD   albuterol sulfate HFA (PROVENTIL HFA) 108 (90 Base) MCG/ACT inhaler Inhale 2 puffs into the lungs every 6 hours as needed for Wheezing 3/17/20  Yes JARRED Wade NP   atorvastatin (LIPITOR) 40 MG tablet TAKE 1 TABLET BY MOUTH DAILY  Patient taking differently: Take 40 mg by mouth nightly TAKE 1 TABLET BY MOUTH DAILY 3/11/20  Yes Maryetta Seip, MD     Allergies  is allergic to tetanus toxoids. Family History  family history includes Lung Cancer in his mother; Other in his sister; Prostate Cancer in his father. Social History   reports that he quit smoking about 6 years ago. His smoking use included cigarettes. He has a 49.00 pack-year smoking history. He has never used smokeless tobacco.  1 PPD for 49 years, quit 2014. reports current alcohol use. Weekly. reports no history of drug use. Marital Status   Occupation works for [employer]. OBJECTIVE:   VITALS:  height is 5' 9\" (1.753 m) and weight is 195 lb 2 oz (88.5 kg). His temporal temperature is 98.5 °F (36.9 °C). His blood pressure is 138/78 and his pulse is 60. His respiration is 16 and oxygen saturation is 94%. CONSTITUTIONAL:alert & oriented x 3, no acute distress. Very pleasant. SKIN:  Warm and dry, no rashes on exposed areas of skin. Vitiligo noted BUE and BLE, facial, areas of hypopigmentation. HEAD:  Normocephalic, atraumatic   EYES: PERRL. EOMs intact. EARS:  Hearing loss mild. NOSE:  Nares patent. No rhinorrhea. MOUTH/THROAT:  Lower partial denture  NECK:supple, no lymphadenopathy  LUNGS: Clear to auscultation bilaterally, no wheezes. CARDIOVASCULAR: Heart sounds are normal.  Regular rate (very mildly bradycardic) and rhythm. ABDOMEN: soft, non tender, non distended. EXTREMITIES: no edema bilateral lower extremities. Testing:   EKG: in epic/chart 5/2020  Labs pending: drawn 9/29/2020     IMPRESSIONS:   Abnormal SULY   has a past medical history of Asthma, Chronic bronchitis ([address] COPD (chronic obstructive pulmonary disease) ([address] Diverticulosis, GERD (gastroesophageal reflux disease), [place] (hard of hearing), Hyperlipidemia, Hypertension, Pulmonary nodule, Snores, Vitiligo, Wears glasses, and Wears partial dentures.    PLANS:   Prostate biopsy    ANDREINA VALENTE TRUNG BLUNT  Electronically signed 9/29/2020 at 10:04 AM

## 2020-09-29 NOTE — PROGRESS NOTES
Anesthesia Focused Assessment    STOP-BANG Sleep Apnea Questionnaire    SNORE loudly (heard through closed doors)? Yes  TIRED, fatigued, sleepy during daytime? No  OBSERVED stopping breathing during sleep? No  High blood PRESSURE being treated? Yes    BMI over 35? No  AGE over 48? Yes  NECK circumference over 16\"? No  GENDER (male)? Yes             Total 4  High risk 5-8  Intermediate risk 3-4  Low risk 0-2    Obstructive Sleep Apnea: snores but denies apnea  If YES, machine used: no     Type 1 DM:   no  T2DM:  no    Coronary Artery Disease:  no  Hypertension:  yes    Active smoker:  1 ppd for 49 years, quit 2014. Drinks Alcohol:  weekly    Dentition: lower partial denture    Defib / AICD / Pacemaker: no      Renal Failure/dialysis:  no    Patient was evaluated in PAT & anesthesia guidelines were applied. NPO guidelines, medication instructions and scheduled arrival time were reviewed with patient.     Hx of anesthesia complications:  no  Family hx of anesthesia complications:  no                                                                                                                     Anesthesia contacted:   no  Medical or cardiac clearance ordered: no    ANDREINA NINO PA-C  9/29/20  8:39 AM

## 2020-09-30 LAB
CULTURE: NO GROWTH
Lab: NORMAL
SPECIMEN DESCRIPTION: NORMAL

## 2020-09-30 RX ORDER — CIPROFLOXACIN 500 MG/1
500 TABLET, FILM COATED ORAL 2 TIMES DAILY
Qty: 8 TABLET | Refills: 0 | Status: SHIPPED | OUTPATIENT
Start: 2020-10-05 | End: 2020-10-09

## 2020-10-02 ENCOUNTER — HOSPITAL ENCOUNTER (OUTPATIENT)
Dept: PREADMISSION TESTING | Age: 70
Discharge: HOME | End: 2020-10-06
Payer: MEDICARE

## 2020-10-02 PROCEDURE — U0003 INFECTIOUS AGENT DETECTION BY NUCLEIC ACID (DNA OR RNA); SEVERE ACUTE RESPIRATORY SYNDROME CORONAVIRUS 2 (SARS-COV-2) (CORONAVIRUS DISEASE [COVID-19]), AMPLIFIED PROBE TECHNIQUE, MAKING USE OF HIGH THROUGHPUT TECHNOLOGIES AS DESCRIBED BY CMS-2020-01-R: HCPCS

## 2020-10-05 LAB — SARS-COV-2, NAA: NOT DETECTED

## 2020-10-06 ENCOUNTER — HOSPITAL ENCOUNTER (OUTPATIENT)
Age: 70
Setting detail: OUTPATIENT SURGERY
Discharge: HOME | End: 2020-10-06
Attending: UROLOGY | Admitting: UROLOGY
Payer: MEDICARE

## 2020-10-06 ENCOUNTER — ANESTHESIA EVENT (OUTPATIENT)
Dept: OPERATING ROOM | Age: 70
End: 2020-10-06
Payer: MEDICARE

## 2020-10-06 ENCOUNTER — ANESTHESIA (OUTPATIENT)
Dept: OPERATING ROOM | Age: 70
End: 2020-10-06
Payer: MEDICARE

## 2020-10-06 ENCOUNTER — HOSPITAL ENCOUNTER (OUTPATIENT)
Dept: ULTRASOUND IMAGING | Age: 70
Setting detail: OUTPATIENT SURGERY
Discharge: HOME | End: 2020-10-08
Attending: UROLOGY
Payer: MEDICARE

## 2020-10-06 VITALS
SYSTOLIC BLOOD PRESSURE: 108 MMHG | TEMPERATURE: 97.2 F | DIASTOLIC BLOOD PRESSURE: 52 MMHG | BODY MASS INDEX: 29.03 KG/M2 | OXYGEN SATURATION: 93 % | WEIGHT: 196 LBS | RESPIRATION RATE: 16 BRPM | HEIGHT: 69 IN | HEART RATE: 56 BPM

## 2020-10-06 VITALS — DIASTOLIC BLOOD PRESSURE: 92 MMHG | SYSTOLIC BLOOD PRESSURE: 117 MMHG | OXYGEN SATURATION: 94 %

## 2020-10-06 LAB — POC POTASSIUM: 4.2 MMOL/L (ref 3.5–4.5)

## 2020-10-06 PROCEDURE — 3700000001 HC ADD 15 MINUTES (ANESTHESIA): Performed by: UROLOGY

## 2020-10-06 PROCEDURE — 2709999900 HC NON-CHARGEABLE SUPPLY: Performed by: UROLOGY

## 2020-10-06 PROCEDURE — 88305 TISSUE EXAM BY PATHOLOGIST: CPT

## 2020-10-06 PROCEDURE — 3600000002 HC SURGERY LEVEL 2 BASE: Performed by: UROLOGY

## 2020-10-06 PROCEDURE — 7100000040 HC SPAR PHASE II RECOVERY - FIRST 15 MIN: Performed by: UROLOGY

## 2020-10-06 PROCEDURE — 3700000000 HC ANESTHESIA ATTENDED CARE: Performed by: UROLOGY

## 2020-10-06 PROCEDURE — 76942 ECHO GUIDE FOR BIOPSY: CPT

## 2020-10-06 PROCEDURE — 88344 IMHCHEM/IMCYTCHM EA MLT ANTB: CPT

## 2020-10-06 PROCEDURE — 2500000003 HC RX 250 WO HCPCS: Performed by: UROLOGY

## 2020-10-06 PROCEDURE — 7100000041 HC SPAR PHASE II RECOVERY - ADDTL 15 MIN: Performed by: UROLOGY

## 2020-10-06 PROCEDURE — 84132 ASSAY OF SERUM POTASSIUM: CPT

## 2020-10-06 PROCEDURE — 6360000002 HC RX W HCPCS: Performed by: NURSE ANESTHETIST, CERTIFIED REGISTERED

## 2020-10-06 PROCEDURE — 3600000012 HC SURGERY LEVEL 2 ADDTL 15MIN: Performed by: UROLOGY

## 2020-10-06 PROCEDURE — 2580000003 HC RX 258: Performed by: ANESTHESIOLOGY

## 2020-10-06 PROCEDURE — 2500000003 HC RX 250 WO HCPCS: Performed by: NURSE ANESTHETIST, CERTIFIED REGISTERED

## 2020-10-06 PROCEDURE — 2580000003 HC RX 258: Performed by: NURSE ANESTHETIST, CERTIFIED REGISTERED

## 2020-10-06 RX ORDER — MIDAZOLAM HYDROCHLORIDE 1 MG/ML
2 INJECTION INTRAMUSCULAR; INTRAVENOUS
Status: DISCONTINUED | OUTPATIENT
Start: 2020-10-06 | End: 2020-10-06 | Stop reason: HOSPADM

## 2020-10-06 RX ORDER — PROPOFOL 10 MG/ML
INJECTION, EMULSION INTRAVENOUS CONTINUOUS PRN
Status: DISCONTINUED | OUTPATIENT
Start: 2020-10-06 | End: 2020-10-06 | Stop reason: SDUPTHER

## 2020-10-06 RX ORDER — SODIUM CHLORIDE, SODIUM LACTATE, POTASSIUM CHLORIDE, CALCIUM CHLORIDE 600; 310; 30; 20 MG/100ML; MG/100ML; MG/100ML; MG/100ML
INJECTION, SOLUTION INTRAVENOUS CONTINUOUS PRN
Status: DISCONTINUED | OUTPATIENT
Start: 2020-10-06 | End: 2020-10-06 | Stop reason: SDUPTHER

## 2020-10-06 RX ORDER — ONDANSETRON 2 MG/ML
4 INJECTION INTRAMUSCULAR; INTRAVENOUS
Status: DISCONTINUED | OUTPATIENT
Start: 2020-10-06 | End: 2020-10-06 | Stop reason: HOSPADM

## 2020-10-06 RX ORDER — FENTANYL CITRATE 50 UG/ML
50 INJECTION, SOLUTION INTRAMUSCULAR; INTRAVENOUS EVERY 5 MIN PRN
Status: DISCONTINUED | OUTPATIENT
Start: 2020-10-06 | End: 2020-10-06 | Stop reason: HOSPADM

## 2020-10-06 RX ORDER — SODIUM CHLORIDE, SODIUM LACTATE, POTASSIUM CHLORIDE, CALCIUM CHLORIDE 600; 310; 30; 20 MG/100ML; MG/100ML; MG/100ML; MG/100ML
1000 INJECTION, SOLUTION INTRAVENOUS CONTINUOUS
Status: DISCONTINUED | OUTPATIENT
Start: 2020-10-06 | End: 2020-10-06 | Stop reason: HOSPADM

## 2020-10-06 RX ORDER — FENTANYL CITRATE 50 UG/ML
INJECTION, SOLUTION INTRAMUSCULAR; INTRAVENOUS PRN
Status: DISCONTINUED | OUTPATIENT
Start: 2020-10-06 | End: 2020-10-06 | Stop reason: SDUPTHER

## 2020-10-06 RX ORDER — LIDOCAINE HYDROCHLORIDE 10 MG/ML
INJECTION, SOLUTION EPIDURAL; INFILTRATION; INTRACAUDAL; PERINEURAL PRN
Status: DISCONTINUED | OUTPATIENT
Start: 2020-10-06 | End: 2020-10-06 | Stop reason: SDUPTHER

## 2020-10-06 RX ORDER — PROPOFOL 10 MG/ML
INJECTION, EMULSION INTRAVENOUS PRN
Status: DISCONTINUED | OUTPATIENT
Start: 2020-10-06 | End: 2020-10-06 | Stop reason: SDUPTHER

## 2020-10-06 RX ORDER — SODIUM CHLORIDE, SODIUM LACTATE, POTASSIUM CHLORIDE, CALCIUM CHLORIDE 600; 310; 30; 20 MG/100ML; MG/100ML; MG/100ML; MG/100ML
INJECTION, SOLUTION INTRAVENOUS CONTINUOUS
Status: DISCONTINUED | OUTPATIENT
Start: 2020-10-06 | End: 2020-10-06 | Stop reason: HOSPADM

## 2020-10-06 RX ORDER — FENTANYL CITRATE 50 UG/ML
25 INJECTION, SOLUTION INTRAMUSCULAR; INTRAVENOUS ONCE
Status: DISCONTINUED | OUTPATIENT
Start: 2020-10-06 | End: 2020-10-06 | Stop reason: HOSPADM

## 2020-10-06 RX ORDER — SODIUM CHLORIDE 0.9 % (FLUSH) 0.9 %
10 SYRINGE (ML) INJECTION PRN
Status: DISCONTINUED | OUTPATIENT
Start: 2020-10-06 | End: 2020-10-06 | Stop reason: HOSPADM

## 2020-10-06 RX ORDER — ONDANSETRON 2 MG/ML
4 INJECTION INTRAMUSCULAR; INTRAVENOUS ONCE
Status: DISCONTINUED | OUTPATIENT
Start: 2020-10-06 | End: 2020-10-06 | Stop reason: HOSPADM

## 2020-10-06 RX ORDER — LIDOCAINE HYDROCHLORIDE 10 MG/ML
INJECTION, SOLUTION EPIDURAL; INFILTRATION; INTRACAUDAL; PERINEURAL PRN
Status: DISCONTINUED | OUTPATIENT
Start: 2020-10-06 | End: 2020-10-06 | Stop reason: ALTCHOICE

## 2020-10-06 RX ORDER — PHENYLEPHRINE HYDROCHLORIDE 10 MG/ML
INJECTION INTRAVENOUS PRN
Status: DISCONTINUED | OUTPATIENT
Start: 2020-10-06 | End: 2020-10-06 | Stop reason: SDUPTHER

## 2020-10-06 RX ORDER — SODIUM CHLORIDE 0.9 % (FLUSH) 0.9 %
10 SYRINGE (ML) INJECTION EVERY 12 HOURS SCHEDULED
Status: DISCONTINUED | OUTPATIENT
Start: 2020-10-06 | End: 2020-10-06 | Stop reason: HOSPADM

## 2020-10-06 RX ORDER — CEFAZOLIN SODIUM 2 G/50ML
SOLUTION INTRAVENOUS PRN
Status: DISCONTINUED | OUTPATIENT
Start: 2020-10-06 | End: 2020-10-06 | Stop reason: SDUPTHER

## 2020-10-06 RX ADMIN — LIDOCAINE HYDROCHLORIDE 25 MG: 10 INJECTION, SOLUTION EPIDURAL; INFILTRATION; INTRACAUDAL; PERINEURAL at 11:08

## 2020-10-06 RX ADMIN — SODIUM CHLORIDE, POTASSIUM CHLORIDE, SODIUM LACTATE AND CALCIUM CHLORIDE 1000 ML: 600; 310; 30; 20 INJECTION, SOLUTION INTRAVENOUS at 10:10

## 2020-10-06 RX ADMIN — PROPOFOL 100 MG: 10 INJECTION, EMULSION INTRAVENOUS at 11:08

## 2020-10-06 RX ADMIN — FENTANYL CITRATE 25 MCG: 50 INJECTION INTRAMUSCULAR; INTRAVENOUS at 11:08

## 2020-10-06 RX ADMIN — CEFAZOLIN SODIUM 2 G: 2 SOLUTION INTRAVENOUS at 11:08

## 2020-10-06 RX ADMIN — PROPOFOL 100 MCG/KG/MIN: 10 INJECTION, EMULSION INTRAVENOUS at 11:10

## 2020-10-06 RX ADMIN — SODIUM CHLORIDE, POTASSIUM CHLORIDE, SODIUM LACTATE AND CALCIUM CHLORIDE: 600; 310; 30; 20 INJECTION, SOLUTION INTRAVENOUS at 11:01

## 2020-10-06 RX ADMIN — PHENYLEPHRINE HYDROCHLORIDE 100 MCG: 10 INJECTION INTRAVENOUS at 11:25

## 2020-10-06 RX ADMIN — FENTANYL CITRATE 75 MCG: 50 INJECTION INTRAMUSCULAR; INTRAVENOUS at 11:12

## 2020-10-06 RX ADMIN — PHENYLEPHRINE HYDROCHLORIDE 100 MCG: 10 INJECTION INTRAVENOUS at 11:31

## 2020-10-06 ASSESSMENT — PULMONARY FUNCTION TESTS
PIF_VALUE: 1
PIF_VALUE: 0
PIF_VALUE: 1
PIF_VALUE: 0
PIF_VALUE: 1
PIF_VALUE: 0
PIF_VALUE: 1
PIF_VALUE: 1
PIF_VALUE: 0
PIF_VALUE: 0
PIF_VALUE: 1
PIF_VALUE: 0
PIF_VALUE: 0
PIF_VALUE: 1
PIF_VALUE: 0
PIF_VALUE: 1

## 2020-10-06 ASSESSMENT — PAIN SCALES - GENERAL
PAINLEVEL_OUTOF10: 0

## 2020-10-06 ASSESSMENT — PAIN - FUNCTIONAL ASSESSMENT: PAIN_FUNCTIONAL_ASSESSMENT: 0-10

## 2020-10-06 NOTE — INTERVAL H&P NOTE
Update History & Physical    The patient's History and Physical of 9/29/2020 was reviewed with the patient and I examined the patient. There was No change. The surgical site was confirmed by the patient and me. Plan: The risks, benefits, expected outcome, and alternative to the recommended procedure have been discussed with the patient. Patient understands and wants to proceed with the procedure.      Electronically signed by Victor Hugo Matthews PA-C on 10/6/2020 at 8:43 AM

## 2020-10-06 NOTE — ANESTHESIA PRE PROCEDURE
Department of Anesthesiology  Preprocedure Note       Name:  Dennis Winn   Age:  78 y.o.  :  1950                                          MRN:  [id number]         Date:  10/6/2020      Surgeon: Teresa Baer):  Leonardo Shin MD    Procedure: Procedure(s):  PROSTATE BIOPSY, ULTRASOUND (CONFIRMED W/ US-VERITO)    Medications prior to admission:   Prior to Admission medications    Medication Sig Start Date End Date Taking?  Authorizing Provider   ciprofloxacin (CIPRO) 500 MG tablet Take 1 tablet by mouth 2 times daily for 4 days Start the day before the procedure 10/5/20 10/9/20 Yes Leonardo Shin MD   losartan (COZAAR) 100 MG tablet TAKE 1 TABLET BY MOUTH DAILY (TAKE WITH HYDROCHLOROTHIAZIDE 12.5 MG TABLETS)  Patient taking differently: Take 100 mg by mouth daily  20  Yes JARRED Soto NP   hydrochlorothiazide (HYDRODIURIL) 12.5 MG tablet TAKE 1 TABLET BY MOUTH DAILY with 100 mg Losartan  Patient taking differently: Take 12.5 mg by mouth daily TAKE 1 TABLET BY MOUTH DAILY with 100 mg Losartan 20  Yes JARRED Mata NP   albuterol sulfate HFA (PROVENTIL HFA) 108 (90 Base) MCG/ACT inhaler Inhale 2 puffs into the lungs every 6 hours as needed for Wheezing 3/17/20  Yes JARRED Soto NP   atorvastatin (LIPITOR) 40 MG tablet TAKE 1 TABLET BY MOUTH DAILY  Patient taking differently: Take 40 mg by mouth nightly TAKE 1 TABLET BY MOUTH DAILY 3/11/20  Yes Arvin Menendez MD   tamsulosin [facility]) 0.4 MG capsule Take 1 capsule by mouth daily  Patient taking differently: Take 0.4 mg by mouth nightly  20   Leonardo Shin MD       Current medications:    Current Facility-Administered Medications   Medication Dose Route Frequency Provider Last Rate Last Dose    lactated ringers infusion 1,000 mL  1,000 mL Intravenous Continuous Pierce Ortiz MD 50 mL/hr at 10/06/20 1010 1,000 mL at 10/06/20 1010    fentaNYL (SUBLIMAZE) injection 50 mcg  50 mcg Intravenous Q5 Min PRN Pierce Ortiz MD  ondansetron (ZOFRAN) injection 4 mg  4 mg Intravenous Once PRN Emilio Romero MD        fentaNYL (SUBLIMAZE) injection 50 mcg  50 mcg Intravenous Q5 Min PRN Emilio Romero MD           Allergies: Allergies   Allergen Reactions    Tetanus Toxoids Other (See Comments)     FEVER, SWEATING. Problem List:    Patient Active Problem List   Diagnosis Code    Mixed hyperlipidemia E78.2    Sinus congestion R09.81    BMI 27.0-27.9,adult Z68.27    Weight loss counseling, encounter for Z71.3    Hyperglycemia R73.9    Vitiligo L80    Benign prostatic hyperplasia with urinary obstruction N40.1, N13.8       Past Medical History:        Diagnosis Date    Asthma     MILD    Chronic bronchitis (HCC)     MILD    COPD (chronic obstructive pulmonary disease) ([facility] [address]     MILD    Diverticulosis     GERD (gastroesophageal reflux disease)     [place] (hard of hearing)     Hyperlipidemia     Hypertension     PCP DR Jevon Perez    Pulmonary nodule     Snores     denies apnea    Vitiligo     Wears glasses     READING    Wears partial dentures     LOWER       Past Surgical History:        Procedure Laterality Date    CARDIAC CATHETERIZATION  1990s    no blockage per patient    CATARACT REMOVAL Bilateral     CATARACTS WITH IOL PLACED.  COLONOSCOPY      COLONOSCOPY N/A 2018    COLONOSCOPY POLYPECTOMY HOT performed by Larry Butler MD at [address] [place] [address]      neck POSTERIOR    PRE-MALIGNANT / BENIGN SKIN LESION EXCISION      UPPER LEFT POSTERIOR BACK. Social History:    Social History     Tobacco Use    Smoking status: Former Smoker     Packs/day: 1.00     Years: 49.00     Pack years: 49.00     Types: Cigarettes     Last attempt to quit:      Years since quittin.7    Smokeless tobacco: Never Used   Substance Use Topics    Alcohol use:  Yes     Alcohol/week: 0.0 standard drinks     Comment: 12 pack  ON WEEKENDS                                Counseling given: Not Answered      Vital Signs (Current):   Vitals:    10/06/20 0934 10/06/20 0952   BP:  (!) 142/78   Pulse:  55   Resp:  16   Temp:  97 °F (36.1 °C)   TempSrc:  Temporal   SpO2:  98%   Weight: 196 lb (88.9 kg)    Height: 5' 9\" (1.753 m)                                               BP Readings from Last 3 Encounters:   10/06/20 (!) 142/78   09/29/20 138/78   03/17/20 134/82       NPO Status: Time of last liquid consumption: 2359                        Time of last solid consumption: 2359                        Date of last liquid consumption: 10/05/20                        Date of last solid food consumption: 10/05/20    BMI:   Wt Readings from Last 3 Encounters:   10/06/20 196 lb (88.9 kg)   09/29/20 195 lb 2 oz (88.5 kg)   06/25/20 197 lb 3.2 oz (89.4 kg)     Body mass index is 28.94 kg/m². CBC:   Lab Results   Component Value Date    WBC 7.1 07/24/2019    RBC 5.09 07/24/2019    RBC 5.30 12/26/2011    HGB 16.2 09/29/2020    HCT 48.8 09/29/2020    MCV 92.5 07/24/2019    RDW 14.0 07/24/2019     07/24/2019     12/26/2011       CMP:   Lab Results   Component Value Date     09/29/2020    K 4.6 09/29/2020     09/29/2020    CO2 25 09/29/2020    BUN 23 09/29/2020    CREATININE 0.90 09/29/2020    GFRAA >60 09/29/2020    LABGLOM >60 09/29/2020    GLUCOSE 129 09/29/2020    GLUCOSE 95 12/26/2011    PROT 7.3 07/24/2019    CALCIUM 9.1 07/24/2019    BILITOT 0.63 07/24/2019    ALKPHOS 72 07/24/2019    AST 23 07/24/2019    ALT 32 07/24/2019       POC Tests: No results for input(s): POCGLU, POCNA, POCK, POCCL, POCBUN, POCHEMO, POCHCT in the last 72 hours.     Coags: No results found for: PROTIME, INR, APTT    HCG (If Applicable): No results found for: PREGTESTUR, PREGSERUM, HCG, HCGQUANT     ABGs: No results found for: PHART, PO2ART, FWQ5QYE, VUU0CDP, BEART, U6PJLNAD     Type & Screen (If Applicable):  No results found for: LABABO, LABRH    Drug/Infectious Status (If Applicable):  No results found for: HIV, HEPCAB    COVID-19 Screening (If Applicable):   Lab Results   Component Value Date    COVID19 Not Detected 10/02/2020         Anesthesia Evaluation  Patient summary reviewed and Nursing notes reviewed no history of anesthetic complications:   Airway: Mallampati: II  TM distance: >3 FB   Neck ROM: full  Mouth opening: > = 3 FB Dental: normal exam         Pulmonary:Negative Pulmonary ROS breath sounds clear to auscultation  (+) COPD:  asthma:                            Cardiovascular:  Exercise tolerance: good (>4 METS),   (+) hypertension:,     (-) valvular problems/murmurs, past MI and CAD      Rhythm: regular  Rate: normal                    Neuro/Psych:   Negative Neuro/Psych ROS              GI/Hepatic/Renal: Neg GI/Hepatic/Renal ROS  (+) GERD:,           Endo/Other: Negative Endo/Other ROS                    Abdominal:       Abdomen: soft. Vascular: negative vascular ROS. Anesthesia Plan      general and MAC     ASA 3       Induction: intravenous. MIPS: Postoperative opioids intended and Prophylactic antiemetics administered. Anesthetic plan and risks discussed with patient. Plan discussed with CRNA.     Attending anesthesiologist reviewed and agrees with [address] Rosa Kruger MD   10/6/2020

## 2020-10-06 NOTE — OP NOTE
Operative Note      Patient: Patrice Berumen  YOB: 1950  MRN: [id number]    Date of Procedure: 10/6/2020    Pre-Op Diagnosis: Elevated PSA; Abnormal SULY;  Abnormal MRI lesion. Post-Op Diagnosis: Elevated PSA; Abnormal SULY;  Abnormal MRI lesion. Procedure(s):  PROSTATE BIOPSY, ULTRASOUND (CONFIRMED W/ US-VERITO)    Surgeon(s):  Destinee Gomez MD    Assistant: Estrella Perales MD    Anesthesia: Monitor Anesthesia Care    Estimated Blood Loss (mL): Minimal    Complications: None    Specimens:   ID Type Source Tests Collected by Time Destination   A : LLB Tissue Prostate SURGICAL PATHOLOGY Destinee Gomez MD 10/6/2020 1104    B : RLA Tissue Prostate SURGICAL PATHOLOGY Destinee Gomez MD 10/6/2020 1105    C : RA Tissue Prostate SURGICAL PATHOLOGY Destinee Gomez MD 10/6/2020 1105    D : RLM Tissue Prostate SURGICAL PATHOLOGY Destinee Gomez MD 10/6/2020 1105    E : RM Tissue Prostate SURGICAL PATHOLOGY Destinee Gomez MD 10/6/2020 1105    F : RLB Tissue Prostate SURGICAL PATHOLOGY Destinee Gomez MD 10/6/2020 1105    G : RB Tissue Prostate SURGICAL PATHOLOGY Destinee Gomez MD 10/6/2020 1105    H : LA Tissue Prostate SURGICAL PATHOLOGY Destinee Gomez MD 10/6/2020 1104    I : LLA Tissue Prostate SURGICAL PATHOLOGY Destinee Gomez MD 10/6/2020 1104    J : LM Tissue Prostate SURGICAL PATHOLOGY Destinee Gomez MD 10/6/2020 1104    K : LLM Tissue Prostate SURGICAL PATHOLOGY Destinee Gomez MD 10/6/2020 1104    L : LB Tissue Prostate SURGICAL PATHOLOGY Destinee Gomez MD 10/6/2020 1104        Implants:  None      Drains: None    Findings: Prostate Volume 47.6mL. Two cores taken from each quadrant, additional sample from the suspicious PIRADS 4 lesion seen on MRI. Detailed Description of Procedure: Patient was brought back to the operating room table. He was laid in the supine position. EPC cuffs were placed on and functioning prior to induction of anesthesia. Anesthesia was induced. A timeout performed.   The ultrasound probe was placed per rectum. The prostate was brought into view. As previously noted his prostates proximally 47.6 g. Seminal vesicles appeared normal.  We used 1 percent lidocaine to inject around the neurovascular bundle bilaterally. We then proceeded with a standard sextant biopsy starting on the right side taking biopsies from the mid and lateral aspects of the base, mid and apical gland and then in the left side for a total of 12 specimens. There was no hypoechoic nodule. These core specimens were sent off for permanent pathology. After completion of the biopsy we then removed the ultrasound probe. There is no evidence of brisk bleeding per the rectum. The patient tolerated the procedure well. His anesthesia was reversed. He was then taken to recovery in stable condition. He was discharged home in stable condition and instructed to follow-up for review of his pathology results. He is instructed to call if he has any fevers shaking chills. The attending was present for the entire case.     Disposition Follow-up: Patient will follow up in 1-2 weeks for review of pathology results      Electronically signed by Paula Monique MD on 10/6/2020 at 11:30 AM

## 2020-10-08 LAB — SURGICAL PATHOLOGY REPORT: NORMAL

## 2020-10-20 ENCOUNTER — OFFICE VISIT (OUTPATIENT)
Dept: UROLOGY | Age: 70
End: 2020-10-20
Payer: MEDICARE

## 2020-10-20 VITALS
TEMPERATURE: 97.2 F | BODY MASS INDEX: 29.03 KG/M2 | SYSTOLIC BLOOD PRESSURE: 138 MMHG | HEART RATE: 69 BPM | WEIGHT: 196 LBS | DIASTOLIC BLOOD PRESSURE: 64 MMHG | HEIGHT: 69 IN

## 2020-10-20 PROCEDURE — 99213 OFFICE O/P EST LOW 20 MIN: CPT | Performed by: UROLOGY

## 2020-10-20 ASSESSMENT — ENCOUNTER SYMPTOMS
WHEEZING: 0
ABDOMINAL PAIN: 0
VOMITING: 0
COUGH: 0
EYE REDNESS: 0
SHORTNESS OF BREATH: 0
EYE PAIN: 0
CONSTIPATION: 0
BACK PAIN: 0
DIARRHEA: 0
NAUSEA: 0

## 2020-10-20 NOTE — PROGRESS NOTES
[address] [address]  Dept: [address] [facility] Urology Office Note - Established    Patient:  Jani Mason  YOB: 1950  Date: 10/20/2020    The patient is a 78 y.o. male who presents todayfor evaluation of the following problems:   Chief Complaint   Patient presents with    Results     Prostate Bx results        HPI  Had prostate biopsy for elevated psa. Path neg. urinatgin ok , no dysuria no hematuria, no fevers,     Summary of old records: N/A    Additional History: N/A    Procedures Today: N/A    Urinalysis today:  No results found for this visit on 10/20/20.   Last several PSA's:  Lab Results   Component Value Date    PSA 1.09 08/27/2020    PSA 1.20 06/23/2020    PSA 0.79 08/30/2019     Last total testosterone:  Lab Results   Component Value Date    TESTOSTERONE 367 07/22/2013       AUA Symptom Score (10/20/2020):  INCOMPLETE EMPTYING: How often have you had the sensation of not emptying your bladder?: Not at all  FREQUENCY: How often do you have to urinate less than every two hours?: Not at all  INTERMITTENCY: How often have you found you stopped and started again several times when you urinated?: Not at all  URGENCY: How often have you found it difficult to postpone urination?: Less than 1 to 5 times  WEAK STREAM: How often have you had a weak urinary stream?: Less than 1 to 5 times  STRAINING: How often have you had to strain to start  urination?: Not at all  NOCTURIA: How many times did you typically get up at night to uriniate?: NONE  TOTAL I-PSS SCORE[de-identified] 2  How would you feel if you were to spend the rest of your life with your urinary condition?: Mostly Satisfied    Last BUN and creatinine:  Lab Results   Component Value Date    BUN 23 09/29/2020     Lab Results   Component Value Date    CREATININE 0.90 09/29/2020       Additional Lab/Culture results: none    Imaging Reviewed during this Office Visit: none  (results were independently reviewed by physician and radiology report verified)    PAST MEDICAL, FAMILY AND SOCIAL HISTORY UPDATE:  Past Medical History:   Diagnosis Date    Asthma     MILD    Chronic bronchitis ([facility] [address]     MILD    COPD (chronic obstructive pulmonary disease) ([facility] [address]     MILD    Diverticulosis     GERD (gastroesophageal reflux disease)     [place] (hard of hearing)     Hyperlipidemia     Hypertension     PCP DR Shayna Pierce    Pulmonary nodule     Snores     denies apnea    Vitiligo     Wears glasses     READING    Wears partial dentures     LOWER     Past Surgical History:   Procedure Laterality Date    CARDIAC CATHETERIZATION  1990s    no blockage per patient    CATARACT REMOVAL Bilateral     CATARACTS WITH IOL PLACED.  COLONOSCOPY      COLONOSCOPY N/A 6/27/2018    COLONOSCOPY POLYPECTOMY HOT performed by Deshawn Freitas MD at [facility] POSTERIOR    PRE-MALIGNANT / BENIGN SKIN LESION EXCISION      UPPER LEFT POSTERIOR BACK.     PROSTATE BIOPSY  10/06/2020    PROSTATE BIOPSY N/A 10/6/2020    PROSTATE BIOPSY, ULTRASOUND (CONFIRMED W/ US-VERITO) performed by Sade Vinson MD at [address] [facility] History   Problem Relation Age of Onset    Prostate Cancer Father         WITH METS TO BONE    Lung Cancer Mother         LUNG    Other Sister         diverticular disease     Outpatient Medications Marked as Taking for the 10/20/20 encounter (Office Visit) with Sade Vinson MD   Medication Sig Dispense Refill    losartan (COZAAR) 100 MG tablet TAKE 1 TABLET BY MOUTH DAILY (TAKE WITH HYDROCHLOROTHIAZIDE 12.5 MG TABLETS) (Patient taking differently: Take 100 mg by mouth daily ) 90 tablet 3    hydrochlorothiazide (HYDRODIURIL) 12.5 MG tablet TAKE 1 TABLET BY MOUTH DAILY with 100 mg Losartan (Patient taking differently: Take 12.5 mg by mouth daily TAKE 1 TABLET BY MOUTH DAILY with 100 mg Losartan) 30 tablet 3    tamsulosin (FLOMAX) 0.4 MG capsule Take 1 capsule by mouth daily (Patient taking differently: Take 0.4 mg by mouth nightly ) 90 capsule 3    albuterol sulfate HFA (PROVENTIL HFA) 108 (90 Base) MCG/ACT inhaler Inhale 2 puffs into the lungs every 6 hours as needed for Wheezing 1 Inhaler 3    atorvastatin (LIPITOR) 40 MG tablet TAKE 1 TABLET BY MOUTH DAILY (Patient taking differently: Take 40 mg by mouth nightly TAKE 1 TABLET BY MOUTH DAILY) 90 tablet 3       Tetanus toxoids  Social History     Tobacco Use   Smoking Status Former Smoker    Packs/day: 1.00    Years: 49.00    Pack years: 49.00    Types: Cigarettes    Last attempt to quit:     Years since quittin.8   Smokeless Tobacco Never Used     (Ifpatient a smoker, smoking cessation counseling offered)    Social History     Substance and Sexual Activity   Alcohol Use Yes    Alcohol/week: 0.0 standard drinks    Comment: 12 pack  ON WEEKENDS       REVIEW OF SYSTEMS:  Review of Systems    Physical Exam:      Vitals:    10/20/20 1555   BP: 138/64   Pulse: 69   Temp: 97.2 °F (36.2 °C)     Body mass index is 28.94 kg/m². Patient is a 78 y.o. male in no acute distress and alert and oriented to person, place and time. Physical Exam  Constitutional: Patient in no acute distress. Neuro: Alert and oriented to person, place and time. Psych: Mood normal, affect normal  Skin: No rash noted  HEENT: Head: Normocephalic andatraumatic  Conjunctivae and EOM are normal. Pupils are equal, round  Nose:Normal  Right External Ear: Normal; Left External Ear: Normal  Mouth: Mucosa Moist  Neck: Supple  Lungs: Respiratory effort is normal  Cardiovascular: Warm & Pink  Abdomen: Soft, non-tender, non-distended with no CVA,  No flank tenderness,  Or hepatosplenomegaly       Assessment and Plan      1. Benign prostatic hyperplasia with incomplete bladder emptying    2. Elevated PSA           Plan:       Return in about 6 months (around 2021) for Follow up.     Prescriptions Ordered:  No orders of

## 2020-12-22 RX ORDER — TAMSULOSIN HYDROCHLORIDE 0.4 MG/1
0.4 CAPSULE ORAL DAILY
Qty: 90 CAPSULE | Refills: 3 | Status: SHIPPED | OUTPATIENT
Start: 2020-12-22 | End: 2021-12-20 | Stop reason: SDUPTHER

## 2021-02-15 ENCOUNTER — TELEPHONE (OUTPATIENT)
Dept: PHARMACY | Facility: CLINIC | Age: 71
End: 2021-02-15

## 2021-02-15 NOTE — TELEPHONE ENCOUNTER
[facility] CLINICAL PHARMACY REVIEW: ADHERENCE REVIEW  Identified care gap per [employer]; fills at [address]: ACE/ARB adherence    Last Office Visit: 10/22/20    Patient also appears to be prescribed: atorvastatin 40 mg daily. ASSESSMENT  ACE/ARB ADHERENCE  (2020 [facility] = 41%)     Per Insurance Records through 12/20/20:  Losartan/HCTZ last filled on 3/17/20 for a 90 day supply. Per Reconciled Dispense Report:   Losartan 100 mg last filled on 7/16/20 for 90 day supply. Per [facility]:   Losartan 100 mg last picked up in November 2020 for 90 day supply. Pharmacist states that the patient pays cash for this medication. BP Readings from Last 3 Encounters:   10/22/20 120/62   10/20/20 138/64   10/06/20 (!) 108/52     CrCl cannot be calculated (Patient's most recent lab result is older than the maximum 120 days allowed. ). STATIN ADHERENCE  (2020 [facility] = no insurance claims to review in 2020)     Per Reconciled Dispense Report   Atorvastatin filled on 3/11/20 #90, 6/23/20 #90 and 9/28/20 #90. Per [facility]:   Atorvastatin filled on 12/28/20 #30 and 1/27/21 #30. Per [facility] pharmacy:   Atorvastatin last picked up on 1/27/21 for 30 day supply. 11 refills remaining. Billed through [facility]. Pharmacist states that they are not currently filling blister packs and have offered to do so in the past, but patient declines because he has issues affording his medications. Pharmacist states that patient is well known to him and he picks up medications when he is able and they convert to 90 day supply when he can afford.      Lab Results   Component Value Date    CHOL 140 02/29/2020    TRIG 134 02/29/2020    HDL 35 (L) 02/29/2020    LDLCHOLESTEROL 78 02/29/2020     ALT   Date Value Ref Range Status   07/24/2019 32 5 - 41 U/L Final     AST   Date Value Ref Range Status   07/24/2019 23 <40 U/L Final     The 10-year ASCVD risk score ([address] et al., 2013) is: 19.8%    Values used to calculate the score:      Age: 69 years      Sex: Male      Is Non- : No      Diabetic: No      Tobacco smoker: No      Systolic Blood Pressure: 054 mmHg      Is BP treated: Yes      HDL Cholesterol: 35 mg/dL      Total Cholesterol: 140 mg/dL     PLAN  No patient out reach planned at this time. Will sign off. Shukri Farmer, PharmD, [place]  Direct: [phone]  Department, toll free [phone], option 7          For Pharmacy Admin Tracking Only    PHSO: Yes  Total # of Interventions Recommended: 0  - New Order #: 0 New Medication Order Reason(s):  Adherence  - Maintenance Safety Lab Monitoring #: 1  Recommended intervention potential cost savings: 0  Total Interventions Accepted: 0  Time Spent (min): 5

## 2021-03-02 PROBLEM — J44.9 MILD CHRONIC OBSTRUCTIVE PULMONARY DISEASE (HCC): Status: ACTIVE | Noted: 2021-03-02

## 2021-04-05 ENCOUNTER — HOSPITAL ENCOUNTER (OUTPATIENT)
Age: 71
Discharge: HOME | End: 2021-04-05
Payer: MEDICARE

## 2021-04-05 DIAGNOSIS — R39.14 BENIGN PROSTATIC HYPERPLASIA WITH INCOMPLETE BLADDER EMPTYING: ICD-10-CM

## 2021-04-05 DIAGNOSIS — N40.1 BENIGN PROSTATIC HYPERPLASIA WITH INCOMPLETE BLADDER EMPTYING: ICD-10-CM

## 2021-04-05 LAB — PROSTATE SPECIFIC ANTIGEN: 1 UG/L

## 2021-04-05 PROCEDURE — 36415 COLL VENOUS BLD VENIPUNCTURE: CPT

## 2021-04-05 PROCEDURE — 84153 ASSAY OF PSA TOTAL: CPT

## 2021-04-06 ENCOUNTER — HOSPITAL ENCOUNTER (OUTPATIENT)
Age: 71
Discharge: HOME | End: 2021-04-06
Payer: MEDICARE

## 2021-04-06 DIAGNOSIS — E78.2 MIXED HYPERLIPIDEMIA: ICD-10-CM

## 2021-04-06 LAB
CHOLESTEROL/HDL RATIO: 3.8
CHOLESTEROL: 133 MG/DL
HDLC SERPL-MCNC: 35 MG/DL
LDL CHOLESTEROL: 77 MG/DL (ref 0–130)
TRIGL SERPL-MCNC: 104 MG/DL
VLDLC SERPL CALC-MCNC: ABNORMAL MG/DL (ref 1–30)

## 2021-04-06 PROCEDURE — 36415 COLL VENOUS BLD VENIPUNCTURE: CPT

## 2021-04-06 PROCEDURE — 80061 LIPID PANEL: CPT

## 2021-04-12 ENCOUNTER — TELEPHONE (OUTPATIENT)
Dept: ONCOLOGY | Age: 71
End: 2021-04-12

## 2021-04-12 NOTE — TELEPHONE ENCOUNTER
1524 Arouses to name on arrival to PACU , O2 3L NC applied HOB elevated   1530 awake and oriented pt c/o feeling like he needs to void and burning in penis   1535 pt medicated with toradol 15 mg IV and B&O supp   1545 pt starting to rest on and off resp easy   1600 continues to rest on and off CBI running wide open drainage remains very bloody , message sent to Willem Mendoza NP , pt c/o lower back pain    1620 DR Naida Reynaga called and updated about barboza drainage orders given , 20 ml added to barboza balloon   1645 drainage starting to lighten up more cherry color now , pt resting resp easy   1710 pt awakens easily to name , no change in lower back pain , warm blanket applied   1715 pt meets criteria for discharge , transported to room family aware Order received for CT lung screening. EMR and order reviewed. Scheduling notified to contact patient and schedule. Information regarding ct lung screening and smoking cessation referral mailed to patient.

## 2021-04-12 NOTE — LETTER
4/12/2021        David Padron  [address]    Dear David Padron:    Your healthcare provider has ordered a low dose CT scan of the chest for lung cancer screening. You will find enclosed, information about CT lung screening. Please review the statement of understanding, you will be asked to sign a copy of this at the time of your CT scan    If you have not already been contacted to make the appointment for your scan, please call our scheduling department at [phone]    Keep in mind that CT lung screening does not take the place of smoking cessation. If you are a current smoker, you will find enclosed smoking cessation resources. Please do not hesitate to contact me if you have any questions or concerns.     [address] [place] [address],      [id number] [place] [address] Lung Screening Program  [phone]

## 2021-04-15 ENCOUNTER — HOSPITAL ENCOUNTER (OUTPATIENT)
Dept: CT IMAGING | Age: 71
Discharge: HOME | End: 2021-04-17
Payer: MEDICARE

## 2021-04-15 DIAGNOSIS — Z87.891 PERSONAL HISTORY OF TOBACCO USE: ICD-10-CM

## 2021-04-15 PROCEDURE — 71271 CT THORAX LUNG CANCER SCR C-: CPT

## 2021-04-20 ENCOUNTER — OFFICE VISIT (OUTPATIENT)
Dept: UROLOGY | Age: 71
End: 2021-04-20
Payer: MEDICARE

## 2021-04-20 VITALS
TEMPERATURE: 97.8 F | DIASTOLIC BLOOD PRESSURE: 73 MMHG | WEIGHT: 190 LBS | HEART RATE: 73 BPM | SYSTOLIC BLOOD PRESSURE: 136 MMHG | BODY MASS INDEX: 28.14 KG/M2 | HEIGHT: 69 IN

## 2021-04-20 DIAGNOSIS — R97.20 ELEVATED PSA: ICD-10-CM

## 2021-04-20 DIAGNOSIS — R35.1 NOCTURIA: ICD-10-CM

## 2021-04-20 DIAGNOSIS — N40.1 BENIGN PROSTATIC HYPERPLASIA WITH INCOMPLETE BLADDER EMPTYING: Primary | ICD-10-CM

## 2021-04-20 DIAGNOSIS — R39.14 BENIGN PROSTATIC HYPERPLASIA WITH INCOMPLETE BLADDER EMPTYING: Primary | ICD-10-CM

## 2021-04-20 PROCEDURE — 99213 OFFICE O/P EST LOW 20 MIN: CPT | Performed by: UROLOGY

## 2021-04-20 ASSESSMENT — ENCOUNTER SYMPTOMS
CONSTIPATION: 0
ABDOMINAL PAIN: 0
EYE PAIN: 0
WHEEZING: 0
EYE REDNESS: 0
BACK PAIN: 0
DIARRHEA: 0
NAUSEA: 0
VOMITING: 0
SHORTNESS OF BREATH: 0
COUGH: 0

## 2021-04-20 NOTE — PROGRESS NOTES
Review of Systems   Constitutional: Negative for chills, fatigue and fever. Eyes: Negative for pain, redness and visual disturbance. Respiratory: Negative for cough, shortness of breath and wheezing. Cardiovascular: Negative for chest pain and leg swelling. Gastrointestinal: Negative for abdominal pain, constipation, diarrhea, nausea and vomiting. Genitourinary: Positive for frequency (Just in morning after coffee ) and urgency (Just in morning after coffee ). Negative for difficulty urinating, dysuria, flank pain, hematuria, scrotal swelling and testicular pain. Musculoskeletal: Negative for back pain, joint swelling and myalgias. Skin: Negative for rash and wound. Neurological: Negative for dizziness, tremors and numbness. Hematological: Does not bruise/bleed easily.

## 2021-04-20 NOTE — PROGRESS NOTES
[address] [address]  Dept: 92 [facility] Urology Office Note - Established    Patient:  Ifeanyi Schaeffer  YOB: 1950  Date: 4/20/2021    The patient is a 69 y.o. male who presents todayfor evaluation of the following problems:   Chief Complaint   Patient presents with    Benign Prostatic Hypertrophy     6 months w PSA        HPI  Here for bph and elevated psa. Chronic issues. He is on flomax and urinating well. No dysuria, no hematuria, no pain  Nocturia x 2  psa is 1.0  Summary of old records: N/A    Additional History: N/A    Procedures Today: N/A    Urinalysis today:  No results found for this visit on 04/20/21. Last several PSA's:  Lab Results   Component Value Date    PSA 1.00 04/05/2021    PSA 1.09 08/27/2020    PSA 1.20 06/23/2020     Last total testosterone:  Lab Results   Component Value Date    TESTOSTERONE 367 07/22/2013       AUA Symptom Score (4/20/2021):   INCOMPLETE EMPTYING: How often have you had the sensation of not emptying your bladder?: Not at all  FREQUENCY: How often do you have to urinate less than every two hours?: Not at all  INTERMITTENCY: How often have you found you stopped and started again several times when you urinated?: Not at all  URGENCY: How often have you found it difficult to postpone urination?: Less than Half the time  WEAK STREAM: How often have you had a weak urinary stream?: Less than Half the time  STRAINING: How often have you had to strain to start  urination?: Not at all  NOCTURIA: How many times did you typically get up at night to uriniate?: 1 Time  TOTAL I-PSS SCORE[de-identified] 5  How would you feel if you were to spend the rest of your life with your urinary condition?: Pleased    Last BUN and creatinine:  Lab Results   Component Value Date    BUN 23 09/29/2020     Lab Results   Component Value Date    CREATININE 0.90 09/29/2020       Additional Lab/Culture results: none    Imaging Reviewed during this Office Visit: none  (results were independently reviewed by physician and radiology report verified)    PAST MEDICAL, FAMILY AND SOCIAL HISTORY UPDATE:  Past Medical History:   Diagnosis Date    Asthma     MILD    [facility] ([facility]     MILD    COPD (chronic obstructive pulmonary disease) ([facility]     MILD    Diverticulosis     GERD (gastroesophageal reflux disease)     [place] (hard of hearing)     Hyperlipidemia     Hypertension     PCP DR Lori Hanson    Pulmonary nodule     Snores     denies apnea    Vitiligo     Wears glasses     READING    Wears partial dentures     LOWER     Past Surgical History:   Procedure Laterality Date    CARDIAC CATHETERIZATION  1990s    no blockage per patient    CATARACT REMOVAL Bilateral     CATARACTS WITH IOL PLACED.  COLONOSCOPY      COLONOSCOPY N/A 6/27/2018    COLONOSCOPY POLYPECTOMY HOT performed by Fco Thomas MD at [facility]      neck POSTERIOR    PRE-MALIGNANT / BENIGN SKIN LESION EXCISION      UPPER LEFT POSTERIOR BACK.     PROSTATE BIOPSY  10/06/2020    PROSTATE BIOPSY N/A 10/6/2020    PROSTATE BIOPSY, ULTRASOUND (CONFIRMED W/ US-VERITO) performed by Jose Lizarraga MD at [address] [facility] History   Problem Relation Age of Onset    Prostate Cancer Father         WITH METS TO BONE    Lung Cancer Mother         LUNG    Other Sister         diverticular disease     Outpatient Medications Marked as Taking for the 4/20/21 encounter (Office Visit) with Jose Lizarraga MD   Medication Sig Dispense Refill    atorvastatin (LIPITOR) 40 MG tablet TAKE 1 TABLET BY MOUTH DAILY 90 tablet 3    hydroCHLOROthiazide (HYDRODIURIL) 12.5 MG tablet TAKE 1 TABLET BY MOUTH DAILY with 100 mg Losartan 90 tablet 3    tamsulosin (FLOMAX) 0.4 MG capsule Take 1 capsule by mouth daily 90 capsule 3    losartan (COZAAR) 100 MG tablet TAKE 1 TABLET BY MOUTH DAILY (TAKE WITH HYDROCHLOROTHIAZIDE 12.5 MG TABLETS) 90 tablet 3    albuterol sulfate HFA (PROVENTIL HFA) 108 (90 Base) MCG/ACT inhaler Inhale 2 puffs into the lungs every 6 hours as needed for Wheezing 1 Inhaler 3       Tetanus toxoids  Social History     Tobacco Use   Smoking Status Former Smoker    Packs/day: 1.00    Years: 49.00    Pack years: 49.00    Types: Cigarettes    Quit date:     Years since quittin.3   Smokeless Tobacco Never Used     (Ifpatient a smoker, smoking cessation counseling offered)    Social History     Substance and Sexual Activity   Alcohol Use Yes    Alcohol/week: 0.0 standard drinks    Comment: 12 pack  ON WEEKENDS       REVIEW OF SYSTEMS:  Review of Systems    Physical Exam:      Vitals:    21 1312   BP: 136/73   Pulse: 73   Temp: 97.8 °F (36.6 °C)     Body mass index is 28.06 kg/m². Patient is a 69 y.o. male in no acute distress and alert and oriented to person, place and time. Physical Exam  Constitutional: Patient in no acute distress. Neuro: Alert and oriented to person, place and time. Psych: Mood normal, affect normal  Skin: No rash noted  HEENT: Head: Normocephalic andatraumatic  Conjunctivae and EOM are normal. Pupils are equal, round  Nose:Normal  Right External Ear: Normal; Left External Ear: Normal  Mouth: Mucosa Moist  Neck: Supple  Lungs: Respiratory effort is normal  Cardiovascular: Warm & Pink  Abdomen: Soft, non-tender, non-distended with no CVA,  No flank   Assessment and Plan      1. Benign prostatic hyperplasia with incomplete bladder emptying    2. Nocturia    3. Elevated PSA           Plan:     f/u in one year with psa  Cont flomax  Return in about 1 year (around 2022). Prescriptions Ordered:  No orders of the defined types were placed in this encounter. Orders Placed:  Orders Placed This Encounter   Procedures    PSA, Diagnostic     Standing Status:   Future     Standing Expiration Date:   2022           Aram Turner MD    Agree with the ROS entered by the MA.

## 2021-09-23 ENCOUNTER — TELEPHONE (OUTPATIENT)
Dept: PHARMACY | Facility: CLINIC | Age: 71
End: 2021-09-23

## 2021-09-23 NOTE — TELEPHONE ENCOUNTER
Sheryl Grigsby returned call. Educated him on non preferred pharmacies vs preferred. Provided preferred pharmacies for patient. And patient would like to [facility] close to him. Patient will address this with his PCP at his upcoming appt on 10/22/21 to switch medications to preferred.      Patient appreciative of call     For Pharmacy [address] in place:  No   Recommendation Provided To: Patient/Caregiver: 1 via Telephone and Pharmacy: 1   Intervention Detail: Adherence Monitorin and CMR/TIP Completed   Gap Closed?: Yes    Intervention Accepted By: Patient/Caregiver: 1   Time Spent (min): 15

## 2021-09-23 NOTE — TELEPHONE ENCOUNTER
[facility] CLINICAL PHARMACY REVIEW: ADHERENCE REVIEW  Identified care gap per [employer]; fills at [facility] 116: Statin adherence    Last Visit: 9/2/21    Patient found in Outcomes MTM and is currently eligible for TIP    STATIN ADHERENCE    Per Insurance Records through 9/5/21 81%; Potential Fail Date: 9/30/21):   ATORVASTATIN TAB 40MG last filled on 4/27/21 for 90 day supply. Next refill due: 7/26/21    Per Reconciled Dispense Report:  ATORVASTATIN TAB 40MG last filled on 8/13/21 for 30 day supply. Per [facility]:   ATORVASTATIN TAB 40MG last picked up on 9/9/21/ for 90 day supply. 1 refills remaining. Billed through patient paid $25 cash vs his [employer] Results   Component Value Date    CHOL 133 04/06/2021    TRIG 104 04/06/2021    HDL 35 (L) 04/06/2021    LDLCHOLESTEROL 77 04/06/2021     ALT   Date Value Ref Range Status   07/24/2019 32 5 - 41 U/L Final     AST   Date Value Ref Range Status   07/24/2019 23 <40 U/L Final     The 10-year ASCVD risk score (Dmitriy Bills et al., 2013) is: 22%    Values used to calculate the score:      Age: 69 years      Sex: Male      Is Non- : No      Diabetic: No      Tobacco smoker: No      Systolic Blood Pressure: 852 mmHg      Is BP treated: Yes      HDL Cholesterol: 35 mg/dL      Total Cholesterol: 133 mg/dL     PLAN  The following are interventions that have been identified:   - Patient filling at a non-preferred pharmacy  - Patient eligible for TIP in Outcomes MT-completed    Attempting to reach patient to review.  Left message asking for return call. Future Appointments   Date Time Provider Martinez Grigsby   10/22/2021  9:30 AM Selina Peres MD [facility]   3/3/2022 10:00 AM JARRED Shields NP [facility] [address] [facility].   [address] [place] [address] free: [phone]

## 2021-10-04 ENCOUNTER — HOSPITAL ENCOUNTER (OUTPATIENT)
Age: 71
Discharge: HOME | End: 2021-10-04
Payer: MEDICARE

## 2021-10-04 DIAGNOSIS — I10 ESSENTIAL HYPERTENSION: ICD-10-CM

## 2021-10-04 DIAGNOSIS — N40.1 BENIGN PROSTATIC HYPERPLASIA WITH INCOMPLETE BLADDER EMPTYING: ICD-10-CM

## 2021-10-04 DIAGNOSIS — R39.14 BENIGN PROSTATIC HYPERPLASIA WITH INCOMPLETE BLADDER EMPTYING: ICD-10-CM

## 2021-10-04 LAB
ANION GAP SERPL CALCULATED.3IONS-SCNC: 9 MMOL/L (ref 9–17)
BUN BLDV-MCNC: 19 MG/DL (ref 8–23)
BUN/CREAT BLD: ABNORMAL (ref 9–20)
CALCIUM SERPL-MCNC: 9.3 MG/DL (ref 8.6–10.4)
CHLORIDE BLD-SCNC: 101 MMOL/L (ref 98–107)
CO2: 26 MMOL/L (ref 20–31)
CREAT SERPL-MCNC: 0.68 MG/DL (ref 0.7–1.2)
GFR AFRICAN AMERICAN: >60 ML/MIN
GFR NON-AFRICAN AMERICAN: >60 ML/MIN
GFR SERPL CREATININE-BSD FRML MDRD: ABNORMAL ML/MIN/{1.73_M2}
GFR SERPL CREATININE-BSD FRML MDRD: ABNORMAL ML/MIN/{1.73_M2}
GLUCOSE BLD-MCNC: 114 MG/DL (ref 70–99)
HCT VFR BLD CALC: 46.3 % (ref 41–53)
HEMOGLOBIN: 15.6 G/DL (ref 13.5–17.5)
MCH RBC QN AUTO: 31.6 PG (ref 26–34)
MCHC RBC AUTO-ENTMCNC: 33.8 G/DL (ref 31–37)
MCV RBC AUTO: 93.6 FL (ref 80–100)
NRBC AUTOMATED: NORMAL PER 100 WBC
PDW BLD-RTO: 14.7 % (ref 11.5–14.9)
PLATELET # BLD: 208 K/UL (ref 150–450)
PMV BLD AUTO: 6.8 FL (ref 6–12)
POTASSIUM SERPL-SCNC: 4.6 MMOL/L (ref 3.7–5.3)
PROSTATE SPECIFIC ANTIGEN: 0.99 UG/L
RBC # BLD: 4.95 M/UL (ref 4.5–5.9)
SODIUM BLD-SCNC: 136 MMOL/L (ref 135–144)
WBC # BLD: 7.4 K/UL (ref 3.5–11)

## 2021-10-04 PROCEDURE — 85027 COMPLETE CBC AUTOMATED: CPT

## 2021-10-04 PROCEDURE — 84153 ASSAY OF PSA TOTAL: CPT

## 2021-10-04 PROCEDURE — 80048 BASIC METABOLIC PNL TOTAL CA: CPT

## 2021-10-04 PROCEDURE — 36415 COLL VENOUS BLD VENIPUNCTURE: CPT

## 2021-10-22 ENCOUNTER — TELEPHONE (OUTPATIENT)
Dept: GASTROENTEROLOGY | Age: 71
End: 2021-10-22

## 2021-10-22 NOTE — TELEPHONE ENCOUNTER
Called pt regarding referral. No answer; LVM. Pt last colon in 2018 with 5 year recall; pt not due until 2023.  Need to see if pt having any issues

## 2021-11-16 ENCOUNTER — TELEPHONE (OUTPATIENT)
Dept: GASTROENTEROLOGY | Age: 71
End: 2021-11-16

## 2021-11-16 NOTE — TELEPHONE ENCOUNTER
Patient called MEHRDAD that he needs to schedule a colonoscopy .  Please return call [phone]  Thank You

## 2021-11-17 NOTE — TELEPHONE ENCOUNTER
Called pt back; informed him he is not due until 2023 based on note from 2018. Pt denies any issues and is ok with waiting until 2023 for colon.

## 2022-03-28 ENCOUNTER — HOSPITAL ENCOUNTER (OUTPATIENT)
Dept: VASCULAR LAB | Age: 72
Discharge: HOME | End: 2022-03-28
Payer: MEDICARE

## 2022-03-28 DIAGNOSIS — M79.89 RIGHT LEG SWELLING: ICD-10-CM

## 2022-03-28 PROCEDURE — 93971 EXTREMITY STUDY: CPT

## 2022-04-05 ENCOUNTER — HOSPITAL ENCOUNTER (OUTPATIENT)
Age: 72
Discharge: HOME | End: 2022-04-05
Payer: MEDICARE

## 2022-04-05 DIAGNOSIS — R39.14 BENIGN PROSTATIC HYPERPLASIA WITH INCOMPLETE BLADDER EMPTYING: ICD-10-CM

## 2022-04-05 DIAGNOSIS — N40.1 BENIGN PROSTATIC HYPERPLASIA WITH INCOMPLETE BLADDER EMPTYING: Primary | ICD-10-CM

## 2022-04-05 DIAGNOSIS — R39.14 BENIGN PROSTATIC HYPERPLASIA WITH INCOMPLETE BLADDER EMPTYING: Primary | ICD-10-CM

## 2022-04-05 DIAGNOSIS — N40.1 BENIGN PROSTATIC HYPERPLASIA WITH INCOMPLETE BLADDER EMPTYING: ICD-10-CM

## 2022-04-05 LAB — PROSTATE SPECIFIC ANTIGEN: 1.14 UG/L

## 2022-04-05 PROCEDURE — 36415 COLL VENOUS BLD VENIPUNCTURE: CPT

## 2022-04-05 PROCEDURE — 84153 ASSAY OF PSA TOTAL: CPT

## 2022-04-06 PROBLEM — R91.1 SOLITARY PULMONARY NODULE: Status: ACTIVE | Noted: 2022-04-06

## 2022-04-06 PROBLEM — J41.0 SIMPLE CHRONIC BRONCHITIS (HCC): Status: ACTIVE | Noted: 2022-04-06

## 2022-04-07 ENCOUNTER — HOSPITAL ENCOUNTER (OUTPATIENT)
Age: 72
Discharge: HOME | End: 2022-04-09
Payer: MEDICARE

## 2022-04-07 ENCOUNTER — OFFICE VISIT (OUTPATIENT)
Dept: UROLOGY | Age: 72
End: 2022-04-07
Payer: MEDICARE

## 2022-04-07 ENCOUNTER — HOSPITAL ENCOUNTER (OUTPATIENT)
Dept: GENERAL RADIOLOGY | Age: 72
Discharge: HOME | End: 2022-04-09
Payer: MEDICARE

## 2022-04-07 VITALS
HEIGHT: 69 IN | SYSTOLIC BLOOD PRESSURE: 147 MMHG | TEMPERATURE: 96.6 F | BODY MASS INDEX: 30.96 KG/M2 | HEART RATE: 67 BPM | DIASTOLIC BLOOD PRESSURE: 73 MMHG | WEIGHT: 209 LBS

## 2022-04-07 DIAGNOSIS — R35.1 NOCTURIA: ICD-10-CM

## 2022-04-07 DIAGNOSIS — R39.14 BENIGN PROSTATIC HYPERPLASIA WITH INCOMPLETE BLADDER EMPTYING: Primary | ICD-10-CM

## 2022-04-07 DIAGNOSIS — M25.561 ACUTE PAIN OF RIGHT KNEE: ICD-10-CM

## 2022-04-07 DIAGNOSIS — N40.1 BENIGN PROSTATIC HYPERPLASIA WITH INCOMPLETE BLADDER EMPTYING: Primary | ICD-10-CM

## 2022-04-07 PROCEDURE — 73565 X-RAY EXAM OF KNEES: CPT

## 2022-04-07 PROCEDURE — 99212 OFFICE O/P EST SF 10 MIN: CPT | Performed by: UROLOGY

## 2022-04-07 ASSESSMENT — ENCOUNTER SYMPTOMS
SHORTNESS OF BREATH: 0
RESPIRATORY NEGATIVE: 1
NAUSEA: 0
COUGH: 0
WHEEZING: 0
EYES NEGATIVE: 1
EYE PAIN: 0
VOMITING: 0
GASTROINTESTINAL NEGATIVE: 1
BACK PAIN: 0
ABDOMINAL PAIN: 0
EYE REDNESS: 0
DIARRHEA: 0
CONSTIPATION: 0

## 2022-04-07 NOTE — PROGRESS NOTES
[address] [address]  Dept:  [facility] Urology Office Note - Established    Patient:  You Nelson  YOB: 1950  Date: 4/7/2022    The patient is a 66 y.o. male who presents todayfor evaluation of the following problems:   No chief complaint on file. HPI  here for f/u bph and elevated psa. These are chronic issues. Had abnormal MRI 7/2020 pi-rads 4.  had negative bx 10/6/2020  PSA is 1.14  Continues flomax, stream is decent. Has urgency, rare dribbling worse with alcohol. Summary of old records: N/A    Additional History: N/A    Procedures Today: N/A    Urinalysis today:  No results found for this visit on 04/07/22. Last several PSA's:  Lab Results   Component Value Date    PSA 1.14 04/05/2022    PSA 0.99 10/04/2021    PSA 1.00 04/05/2021     Last total testosterone:  Lab Results   Component Value Date    TESTOSTERONE 367 07/22/2013       AUA Symptom Score (4/7/2022):   INCOMPLETE EMPTYING: How often have you had the sensation of not emptying your bladder?: Not at all  FREQUENCY: How often do you have to urinate less than every two hours?: About Half the time (only in the morning)  INTERMITTENCY: How often have you found you stopped and started again several times when you urinated?: Not at all  URGENCY: How often have you found it difficult to postpone urination?: Not at all  WEAK STREAM: How often have you had a weak urinary stream?: Not at all  STRAINING: How often have you had to strain to start  urination?: Not at all  NOCTURIA: How many times did you typically get up at night to uriniate?: NONE  TOTAL I-PSS SCORE[de-identified] 3       Last BUN and creatinine:  Lab Results   Component Value Date    BUN 19 10/04/2021     Lab Results   Component Value Date    CREATININE 0.68 (L) 10/04/2021       Additional Lab/Culture results: none    Imaging Reviewed during this Office Visit: none  (results were independently reviewed by physician and radiology report verified)    PAST MEDICAL, FAMILY AND SOCIAL HISTORY UPDATE:  Past Medical History:   Diagnosis Date    Asthma     MILD    Chronic bronchitis ([address]     MILD    COPD (chronic obstructive pulmonary disease) ([address]     MILD    Diverticulosis     GERD (gastroesophageal reflux disease)     [place] (hard of hearing)     Hyperlipidemia     Hypertension     PCP DR Michelle Huff    Pulmonary nodule     Snores     denies apnea    Vitiligo     Wears glasses     READING    Wears partial dentures     LOWER     Past Surgical History:   Procedure Laterality Date    CARDIAC CATHETERIZATION  1990s    no blockage per patient    CATARACT REMOVAL Bilateral     CATARACTS WITH IOL PLACED.  COLONOSCOPY      COLONOSCOPY N/A 6/27/2018    COLONOSCOPY POLYPECTOMY HOT performed by Nathan Mehta MD at [address] [place] [address]      neck POSTERIOR    PRE-MALIGNANT / BENIGN SKIN LESION EXCISION      UPPER LEFT POSTERIOR BACK.  PROSTATE BIOPSY  10/06/2020    PROSTATE BIOPSY N/A 10/6/2020    PROSTATE BIOPSY, ULTRASOUND (CONFIRMED W/ US-VERITO) performed by Williams Cr MD at [address] [place] [address] History   Problem Relation Age of Onset    Prostate Cancer Father         WITH METS TO BONE    Lung Cancer Mother         LUNG    Other Sister         diverticular disease     Outpatient Medications Marked as Taking for the 4/7/22 encounter (Office Visit) with Williams Cr MD   Medication Sig Dispense Refill    aspirin 81 MG EC tablet Take 81 mg by mouth daily Take 1 pill daily.       losartan (COZAAR) 100 MG tablet Take 1 tablet by mouth daily TAKE 1 TABLET BY MOUTH DAILY (TAKE WITH HYDROCHLOROTHIAZIDE 12.5 MG TABLETS) 90 tablet 3    hydroCHLOROthiazide (HYDRODIURIL) 12.5 MG tablet TAKE 1 TABLET BY MOUTH DAILY with 100 mg Losartan 90 tablet 3    atorvastatin (LIPITOR) 40 MG tablet TAKE 1 TABLET BY MOUTH DAILY 30 tablet 11    tamsulosin (FLOMAX) 0.4 MG capsule Take 1 capsule by mouth daily 90 capsule 1       Tetanus toxoids  Social History     Tobacco Use   Smoking Status Former Smoker    Packs/day: 1.00    Years: 49.00    Pack years: 49.00    Types: Cigarettes    Quit date:     Years since quittin.2   Smokeless Tobacco Never Used     (Ifpatient a smoker, smoking cessation counseling offered)    Social History     Substance and Sexual Activity   Alcohol Use Yes    Alcohol/week: 0.0 standard drinks    Comment: 12 pack  ON WEEKENDS       REVIEW OF SYSTEMS:  Review of Systems    Physical Exam:      Vitals:    22 1044   BP: (!) 147/73   Pulse: 67   Temp: 96.6 °F (35.9 °C)     Body mass index is 30.86 kg/m². Patient is a 66 y.o. male in no acute distress and alert and oriented to person, place and time. Physical Exam  Constitutional: Patient in no acute distress. Neuro: Alert and oriented to person, place and time. Psych: Mood normal, affect normal  Skin: No rash noted  HEENT: Head: Normocephalic andatraumatic  Conjunctivae and EOM are normal. Pupils are equal, round  Nose:Normal  Right External Ear: Normal; Left External Ear: Normal  Mouth: Mucosa Moist  Neck: Supple  Lungs: Respiratory effort is normal  Cardiovascular: Warm & Pink  Abdomen: Soft, non-tender, non-distended with no CVA,  No flank tenderness,  Or hepatosplenomegaly       Assessment and Plan      1. Benign prostatic hyperplasia with incomplete bladder emptying    2. Nocturia           Plan:       Return in about 6 months (around 10/7/2022) for Follow up. Prescriptions Ordered:  No orders of the defined types were placed in this encounter. Orders Placed:  Orders Placed This Encounter   Procedures    PSA, Diagnostic     Standing Status:   Future     Standing Expiration Date:   2023           Butch Kay MD    Agree with the ROS entered by the MA.

## 2022-04-12 ENCOUNTER — TELEPHONE (OUTPATIENT)
Dept: ONCOLOGY | Age: 72
End: 2022-04-12

## 2022-04-12 DIAGNOSIS — Z87.891 PERSONAL HISTORY OF NICOTINE DEPENDENCE: Primary | ICD-10-CM

## 2022-04-12 NOTE — TELEPHONE ENCOUNTER
Our records indicate that your patient is due for their annual lung cancer screening follow up testing. For your convenience, we have pended the order for the scan for you. If you do not agree with the need for the test, please cancel the order and let us know. Sincerely,    [address] [place] [address] Screening Program    Auto printed reminder letter sent to patient.

## 2022-05-04 ENCOUNTER — HOSPITAL ENCOUNTER (OUTPATIENT)
Dept: CT IMAGING | Age: 72
Discharge: HOME | End: 2022-05-06
Payer: MEDICARE

## 2022-05-04 DIAGNOSIS — Z87.891 PERSONAL HISTORY OF NICOTINE DEPENDENCE: ICD-10-CM

## 2022-05-04 PROCEDURE — 71271 CT THORAX LUNG CANCER SCR C-: CPT

## 2022-05-09 ENCOUNTER — TELEPHONE (OUTPATIENT)
Dept: CASE MANAGEMENT | Age: 72
End: 2022-05-09

## 2022-05-09 NOTE — TELEPHONE ENCOUNTER
Lung Navigator reviewing chart and recent Lung Screening . Pt. Needing F/U with Dr. Manas Lynch for further recommendations.

## 2022-06-27 RX ORDER — TAMSULOSIN HYDROCHLORIDE 0.4 MG/1
0.4 CAPSULE ORAL DAILY
Qty: 90 CAPSULE | Refills: 1 | OUTPATIENT
Start: 2022-06-27

## 2022-07-05 RX ORDER — TAMSULOSIN HYDROCHLORIDE 0.4 MG/1
CAPSULE ORAL
Qty: 90 CAPSULE | Refills: 1 | Status: SHIPPED | OUTPATIENT
Start: 2022-07-05

## 2022-07-08 ENCOUNTER — TELEPHONE (OUTPATIENT)
Dept: ONCOLOGY | Age: 72
End: 2022-07-08

## 2022-07-08 RX ORDER — TAMSULOSIN HYDROCHLORIDE 0.4 MG/1
0.4 CAPSULE ORAL DAILY
Qty: 90 CAPSULE | Refills: 1 | OUTPATIENT
Start: 2022-07-08

## 2022-08-04 ENCOUNTER — HOSPITAL ENCOUNTER (OUTPATIENT)
Dept: CT IMAGING | Age: 72
Discharge: HOME | End: 2022-08-06
Payer: MEDICARE

## 2022-08-04 DIAGNOSIS — R91.1 NODULE OF UPPER LOBE OF LEFT LUNG: ICD-10-CM

## 2022-08-04 PROCEDURE — 71250 CT THORAX DX C-: CPT

## 2022-08-18 ENCOUNTER — HOSPITAL ENCOUNTER (OUTPATIENT)
Dept: NUCLEAR MEDICINE | Age: 72
Discharge: HOME | End: 2022-08-20
Payer: MEDICARE

## 2022-08-18 DIAGNOSIS — R91.8 LUNG NODULES: ICD-10-CM

## 2022-08-18 LAB — GLUCOSE BLD-MCNC: 87 MG/DL (ref 75–110)

## 2022-08-18 PROCEDURE — 2580000003 HC RX 258: Performed by: NURSE PRACTITIONER

## 2022-08-18 PROCEDURE — A9552 F18 FDG: HCPCS | Performed by: NURSE PRACTITIONER

## 2022-08-18 PROCEDURE — 82947 ASSAY GLUCOSE BLOOD QUANT: CPT

## 2022-08-18 PROCEDURE — 3430000000 HC RX DIAGNOSTIC RADIOPHARMACEUTICAL: Performed by: NURSE PRACTITIONER

## 2022-08-18 PROCEDURE — 78815 PET IMAGE W/CT SKULL-THIGH: CPT

## 2022-08-18 RX ORDER — FLUDEOXYGLUCOSE F 18 200 MCI/ML
10 INJECTION, SOLUTION INTRAVENOUS
Status: COMPLETED | OUTPATIENT
Start: 2022-08-18 | End: 2022-08-18

## 2022-08-18 RX ORDER — SODIUM CHLORIDE 0.9 % (FLUSH) 0.9 %
10 SYRINGE (ML) INJECTION PRN
Status: DISCONTINUED | OUTPATIENT
Start: 2022-08-18 | End: 2022-08-21 | Stop reason: HOSPADM

## 2022-08-18 RX ADMIN — FLUDEOXYGLUCOSE F 18 10.13 MILLICURIE: 200 INJECTION, SOLUTION INTRAVENOUS at 13:02

## 2022-08-18 RX ADMIN — SODIUM CHLORIDE, PRESERVATIVE FREE 10 ML: 5 INJECTION INTRAVENOUS at 13:02

## 2022-10-20 ENCOUNTER — HOSPITAL ENCOUNTER (OUTPATIENT)
Age: 72
Discharge: HOME | End: 2022-10-20
Payer: MEDICARE

## 2022-10-20 DIAGNOSIS — N40.1 BENIGN PROSTATIC HYPERPLASIA WITH INCOMPLETE BLADDER EMPTYING: ICD-10-CM

## 2022-10-20 DIAGNOSIS — R39.14 BENIGN PROSTATIC HYPERPLASIA WITH INCOMPLETE BLADDER EMPTYING: ICD-10-CM

## 2022-10-20 LAB — PROSTATE SPECIFIC ANTIGEN: 1.18 NG/ML

## 2022-10-20 PROCEDURE — 36415 COLL VENOUS BLD VENIPUNCTURE: CPT

## 2022-10-20 PROCEDURE — 84153 ASSAY OF PSA TOTAL: CPT

## 2022-10-27 ENCOUNTER — OFFICE VISIT (OUTPATIENT)
Dept: UROLOGY | Age: 72
End: 2022-10-27
Payer: MEDICARE

## 2022-10-27 VITALS — BODY MASS INDEX: 29.18 KG/M2 | TEMPERATURE: 97.3 F | WEIGHT: 197 LBS | HEIGHT: 69 IN

## 2022-10-27 DIAGNOSIS — R35.1 NOCTURIA: ICD-10-CM

## 2022-10-27 DIAGNOSIS — N32.81 OAB (OVERACTIVE BLADDER): ICD-10-CM

## 2022-10-27 DIAGNOSIS — R39.14 BENIGN PROSTATIC HYPERPLASIA WITH INCOMPLETE BLADDER EMPTYING: Primary | ICD-10-CM

## 2022-10-27 DIAGNOSIS — N40.1 BENIGN PROSTATIC HYPERPLASIA WITH INCOMPLETE BLADDER EMPTYING: Primary | ICD-10-CM

## 2022-10-27 PROCEDURE — 99214 OFFICE O/P EST MOD 30 MIN: CPT | Performed by: UROLOGY

## 2022-10-27 PROCEDURE — 1123F ACP DISCUSS/DSCN MKR DOCD: CPT | Performed by: UROLOGY

## 2022-10-27 RX ORDER — OXYBUTYNIN CHLORIDE 10 MG/1
10 TABLET, EXTENDED RELEASE ORAL DAILY
Qty: 90 TABLET | Refills: 3 | Status: SHIPPED | OUTPATIENT
Start: 2022-10-27 | End: 2022-10-31 | Stop reason: SDUPTHER

## 2022-10-27 ASSESSMENT — ENCOUNTER SYMPTOMS
VOMITING: 0
NAUSEA: 0
SHORTNESS OF BREATH: 0
WHEEZING: 0
SORE THROAT: 0
COUGH: 0
DIARRHEA: 0

## 2022-10-28 ENCOUNTER — TELEPHONE (OUTPATIENT)
Dept: UROLOGY | Age: 72
End: 2022-10-28

## 2022-10-28 DIAGNOSIS — N32.81 OAB (OVERACTIVE BLADDER): Primary | ICD-10-CM

## 2022-10-28 NOTE — TELEPHONE ENCOUNTER
Patient called in and stated \"I was not aware that the medication that  prescribed me was going to cost over $140. Is there a cheaper medication I can take? \"    Writer let patient know that Dr.Baig GEE will speak with him and call with an update.

## 2022-10-31 RX ORDER — OXYBUTYNIN CHLORIDE 10 MG/1
10 TABLET, EXTENDED RELEASE ORAL DAILY
Qty: 90 TABLET | Refills: 1 | Status: SHIPPED | OUTPATIENT
Start: 2022-10-31

## 2022-10-31 NOTE — TELEPHONE ENCOUNTER
Please notify patient that he can use goodrx coupon at [facility] 15.95$ for 1 month. If that is affordable to patient, please get the nearest [facility] pharmacy and I will send it.

## 2022-10-31 NOTE — TELEPHONE ENCOUNTER
Patient is willing to have medication sent to [facility] on [address] in [place], [place]. [phone]

## 2022-11-03 ENCOUNTER — TELEPHONE (OUTPATIENT)
Dept: GASTROENTEROLOGY | Age: 72
End: 2022-11-03

## 2022-11-04 DIAGNOSIS — K63.5 POLYP OF COLON, UNSPECIFIED PART OF COLON, UNSPECIFIED TYPE: Primary | ICD-10-CM

## 2022-11-09 RX ORDER — POLYETHYLENE GLYCOL 3350 17 G/17G
238 POWDER, FOR SOLUTION ORAL ONCE
Qty: 238 G | Refills: 0 | Status: SHIPPED | OUTPATIENT
Start: 2022-11-09 | End: 2022-11-09

## 2022-11-09 RX ORDER — BISACODYL 5 MG
20 TABLET, DELAYED RELEASE (ENTERIC COATED) ORAL ONCE
Qty: 4 TABLET | Refills: 0 | Status: SHIPPED | OUTPATIENT
Start: 2022-11-09 | End: 2022-11-09

## 2022-12-14 ENCOUNTER — TRANSCRIBE ORDERS (OUTPATIENT)
Dept: ADMINISTRATIVE | Age: 72
End: 2022-12-14

## 2022-12-14 DIAGNOSIS — R91.1 NODULE OF UPPER LOBE OF LEFT LUNG: Primary | ICD-10-CM

## 2022-12-21 ENCOUNTER — HOSPITAL ENCOUNTER (OUTPATIENT)
Dept: CT IMAGING | Age: 72
Discharge: HOME | End: 2022-12-23
Payer: MEDICARE

## 2022-12-21 DIAGNOSIS — R91.1 NODULE OF UPPER LOBE OF LEFT LUNG: ICD-10-CM

## 2022-12-21 PROCEDURE — 71250 CT THORAX DX C-: CPT

## 2022-12-22 ENCOUNTER — HOSPITAL ENCOUNTER (OUTPATIENT)
Dept: PREADMISSION TESTING | Age: 72
Discharge: HOME | End: 2022-12-26

## 2022-12-22 VITALS — BODY MASS INDEX: 29.62 KG/M2 | WEIGHT: 200 LBS | HEIGHT: 69 IN

## 2022-12-22 RX ORDER — M-VIT,TX,IRON,MINS/CALC/FOLIC 27MG-0.4MG
1 TABLET ORAL DAILY
COMMUNITY

## 2022-12-22 NOTE — PROGRESS NOTES

## 2023-01-03 RX ORDER — TAMSULOSIN HYDROCHLORIDE 0.4 MG/1
CAPSULE ORAL
Qty: 90 CAPSULE | Refills: 1 | Status: SHIPPED | OUTPATIENT
Start: 2023-01-03

## 2023-01-24 ENCOUNTER — OFFICE VISIT (OUTPATIENT)
Dept: UROLOGY | Age: 73
End: 2023-01-24
Payer: MEDICARE

## 2023-01-24 VITALS
HEIGHT: 69 IN | TEMPERATURE: 98.6 F | BODY MASS INDEX: 29.62 KG/M2 | DIASTOLIC BLOOD PRESSURE: 68 MMHG | WEIGHT: 200 LBS | HEART RATE: 82 BPM | SYSTOLIC BLOOD PRESSURE: 154 MMHG

## 2023-01-24 DIAGNOSIS — Z87.898 HISTORY OF ELEVATED PSA: ICD-10-CM

## 2023-01-24 DIAGNOSIS — R33.9 URINARY RETENTION: Primary | ICD-10-CM

## 2023-01-24 DIAGNOSIS — Z97.8 FOLEY CATHETER IN PLACE: ICD-10-CM

## 2023-01-24 PROCEDURE — 99213 OFFICE O/P EST LOW 20 MIN: CPT | Performed by: NURSE PRACTITIONER

## 2023-01-24 PROCEDURE — 1123F ACP DISCUSS/DSCN MKR DOCD: CPT | Performed by: NURSE PRACTITIONER

## 2023-01-24 ASSESSMENT — ENCOUNTER SYMPTOMS
DIARRHEA: 0
VOMITING: 0
ABDOMINAL PAIN: 0
COUGH: 0
SHORTNESS OF BREATH: 0
EYE REDNESS: 0
EYE PAIN: 0
WHEEZING: 0
BACK PAIN: 0
CONSTIPATION: 0
NAUSEA: 0

## 2023-01-24 NOTE — PROGRESS NOTES
[address] [address]  Dept: 92 [facility] V[facility] Urology Office Note - Established    Patient:  Boone Lakhani  YOB: 1950  Date: 1/24/23    The patient is a 67 y.o. male who presents todayfor evaluation of the following problems:   Chief Complaint   Patient presents with    Follow-up       HPI  This is a pleasant 60-year-old male with a history of elevated PSA and BPH presenting for follow-up urinary retention. Patient had a recent lobectomy with Dr. Vanessa Mckee at [facility].  Patient reports that the day of discharge, he noticed that he was voiding more frequently. Nurses checked his PVR which was high so Rausch catheter was inserted the day of discharge. That is still in place today. Patient does admit that at the time of discharge he did have some issues with constipation which he feels is related. Patient is chronically on Flomax which she continues to be compliant with. He also does take oxybutynin for urgency, which he continues. Patient is very frustrated with the catheter, he requests it to be removed today. He denies any flank pain, hematuria or dysuria, fever or chills. Last PSA was October 20, 2022= 1.18, he does have a history of elevated PSA with a negative biopsy in the past.    Summary of old records: N/A    Additional History: N/A    Procedures Today: N/A    Urinalysis today:  No results found for this visit on 01/24/23.   Last several PSA's:  Lab Results   Component Value Date    PSA 1.18 10/20/2022    PSA 1.14 04/05/2022    PSA 0.99 10/04/2021     Last total testosterone:  Lab Results   Component Value Date    TESTOSTERONE 367 07/22/2013       Last BUN and creatinine:  Lab Results   Component Value Date    BUN 19 10/04/2021     Lab Results   Component Value Date    CREATININE 0.68 (L) 10/04/2021       Additional Lab/Culture results: none    Imaging Reviewed during this Office Visit: none  (results were independently reviewed by physician and radiology report verified)    PAST MEDICAL, FAMILY AND SOCIAL HISTORY UPDATE:  Past Medical History:   Diagnosis Date    Asthma     MILD    Chronic [facility] ([facility]     MILD    COPD (chronic obstructive pulmonary disease) ([facility]     MILD    Diverticulosis     GERD (gastroesophageal reflux disease)     [place] (hard of hearing)     Hyperlipidemia     Hypertension     PCP DR Christiana Mullen    Pulmonary nodule     Snores     denies apnea    Vitiligo     Wears glasses     READING    Wears partial dentures     LOWER     Past Surgical History:   Procedure Laterality Date    CARDIAC CATHETERIZATION  1990s    no blockage per patient    CATARACT REMOVAL Bilateral     CATARACTS WITH IOL PLACED. COLONOSCOPY      COLONOSCOPY N/A 2018    COLONOSCOPY POLYPECTOMY HOT performed by Franko Joy MD at [address] [place] [address]  10/6/2022    CT BIOPSY ABDOMEN RETROPERITONEUM    CT BIOPSY ABDOMEN RETROPERITONEUM  2022    CT BIOPSY ABDOMEN RETROPERITONEUM    CYST REMOVAL      neck POSTERIOR    PRE-MALIGNANT / BENIGN SKIN LESION EXCISION      UPPER LEFT POSTERIOR BACK. PROSTATE BIOPSY  10/06/2020    PROSTATE BIOPSY N/A 10/6/2020    PROSTATE BIOPSY, ULTRASOUND (CONFIRMED W/ US-VERITO) performed by Velvet Hernandez MD at [address] [place] [address] History   Problem Relation Age of Onset    Prostate Cancer Father         WITH METS TO BONE    Lung Cancer Mother         LUNG    Other Sister         diverticular disease     No outpatient medications have been marked as taking for the 23 encounter (Office Visit) with JARRED Durbin CNP.        Tetanus toxoids and Tetanus immune globulin  Social History     Tobacco Use   Smoking Status Former    Packs/day: 1.00    Years: 49.00    Pack years: 49.00    Types: Cigarettes    Quit date:     Years since quittin.0   Smokeless Tobacco Never     (Ifpatient a smoker, smoking cessation counseling offered)    Social History     Substance and Sexual Activity   Alcohol Use Yes    Alcohol/week: 0.0 standard drinks    Comment: 12 pack  ON WEEKENDS       REVIEW OF SYSTEMS:  Review of Systems    Physical Exam:      Vitals:    01/24/23 1115   BP: (!) 154/68   Pulse: 82   Temp: 98.6 °F (37 °C)     Body mass index is 29.53 kg/m². Patient is a 67 y.o. male in no acute distress and alert and oriented to person, place and time. Physical Exam  Constitutional: Patient in no acute distress. Neuro: Alert and oriented to person, place and time. Psych: Mood normal, affect normal  Skin: No rash noted  Lungs: Respiratory effort is normal  Cardiovascular: Warm & Pink  Abdomen: Soft, non-tender, non-distended   Bladder non-tender and not distended. Musculoskeletal: Normal gait and station    Assessment and Plan      1. Urinary retention    2. Moeller catheter in place    3. History of elevated PSA           Plan:   67year-old with episode of urinary retention following a lung procedure. Patient is adamant that he wants the catheter removed today. He has been on Flomax, will continue. Advised to stop oxybutynin, as it may be contributing to the constipation and the retention. Pt instructed to drink plenty of fluids, try to urinate q 2 hrs, call office in 6 hrs with update of amount voided, and if not voiding will need to return to office to have moeller replaced. Also instructed to return to office or ER if goes >6 hrs without voiding or has strong urge to void/suprapubic discomfort and cannot urinate. He will follow-up in 1 week with a PVR. His next PSA is due in April. Pt verbalizes understanding of plan. Return in about 1 week (around 1/31/2023) for f/u urinary retention with PVR. Prescriptions Ordered:  No orders of the defined types were placed in this encounter.     Orders Placed:  Orders Placed This Encounter   Procedures    PSA, Diagnostic     Standing Status:   Future Standing Expiration Date:   1/25/2024           JARRED Raza CNP    Reviewed and agree with the ROS entered by the MA.

## 2023-01-25 ENCOUNTER — HOSPITAL ENCOUNTER (EMERGENCY)
Age: 73
Discharge: HOME | End: 2023-01-25
Attending: STUDENT IN AN ORGANIZED HEALTH CARE EDUCATION/TRAINING PROGRAM
Payer: MEDICARE

## 2023-01-25 VITALS
TEMPERATURE: 97.7 F | SYSTOLIC BLOOD PRESSURE: 132 MMHG | DIASTOLIC BLOOD PRESSURE: 78 MMHG | HEIGHT: 69 IN | BODY MASS INDEX: 28.14 KG/M2 | WEIGHT: 190 LBS | OXYGEN SATURATION: 94 % | HEART RATE: 84 BPM

## 2023-01-25 DIAGNOSIS — N30.00 ACUTE CYSTITIS WITHOUT HEMATURIA: ICD-10-CM

## 2023-01-25 DIAGNOSIS — R33.9 URINARY RETENTION: Primary | ICD-10-CM

## 2023-01-25 LAB
BACTERIA: ABNORMAL
BILIRUBIN URINE: NEGATIVE
CASTS UA: ABNORMAL /LPF
COLOR: YELLOW
EPITHELIAL CELLS UA: ABNORMAL /HPF
GLUCOSE URINE: NEGATIVE
KETONES, URINE: NEGATIVE
LEUKOCYTE ESTERASE, URINE: ABNORMAL
MUCUS: ABNORMAL
NITRITE, URINE: NEGATIVE
PH UA: 6 (ref 5–8)
PROTEIN UA: NEGATIVE
RBC UA: ABNORMAL /HPF
SPECIFIC GRAVITY UA: 1.02 (ref 1–1.03)
TURBIDITY: ABNORMAL
URINE HGB: ABNORMAL
UROBILINOGEN, URINE: NORMAL
WBC UA: ABNORMAL /HPF

## 2023-01-25 PROCEDURE — 99283 EMERGENCY DEPT VISIT LOW MDM: CPT

## 2023-01-25 PROCEDURE — 81001 URINALYSIS AUTO W/SCOPE: CPT

## 2023-01-25 PROCEDURE — 6370000000 HC RX 637 (ALT 250 FOR IP): Performed by: STUDENT IN AN ORGANIZED HEALTH CARE EDUCATION/TRAINING PROGRAM

## 2023-01-25 PROCEDURE — 87086 URINE CULTURE/COLONY COUNT: CPT

## 2023-01-25 PROCEDURE — 87077 CULTURE AEROBIC IDENTIFY: CPT

## 2023-01-25 PROCEDURE — 87186 SC STD MICRODIL/AGAR DIL: CPT

## 2023-01-25 PROCEDURE — 87184 SC STD DISK METHOD PER PLATE: CPT

## 2023-01-25 RX ORDER — CEPHALEXIN 500 MG/1
500 CAPSULE ORAL 3 TIMES DAILY
Qty: 21 CAPSULE | Refills: 0 | Status: SHIPPED | OUTPATIENT
Start: 2023-01-25 | End: 2023-02-01

## 2023-01-25 RX ORDER — LIDOCAINE HYDROCHLORIDE 20 MG/ML
JELLY TOPICAL
Status: DISCONTINUED
Start: 2023-01-25 | End: 2023-01-25 | Stop reason: HOSPADM

## 2023-01-25 RX ORDER — CEPHALEXIN 250 MG/1
500 CAPSULE ORAL ONCE
Status: COMPLETED | OUTPATIENT
Start: 2023-01-25 | End: 2023-01-25

## 2023-01-25 RX ORDER — LIDOCAINE HYDROCHLORIDE 20 MG/ML
JELLY TOPICAL PRN
Status: DISCONTINUED | OUTPATIENT
Start: 2023-01-25 | End: 2023-01-25 | Stop reason: HOSPADM

## 2023-01-25 RX ADMIN — CEPHALEXIN 500 MG: 250 CAPSULE ORAL at 02:00

## 2023-01-25 ASSESSMENT — ENCOUNTER SYMPTOMS
CHEST TIGHTNESS: 0
DIARRHEA: 0
NAUSEA: 0
VOMITING: 0
ABDOMINAL PAIN: 0
SHORTNESS OF BREATH: 0
SORE THROAT: 0
EYE DISCHARGE: 0
RHINORRHEA: 0
EYE REDNESS: 0

## 2023-01-25 ASSESSMENT — LIFESTYLE VARIABLES
HOW MANY STANDARD DRINKS CONTAINING ALCOHOL DO YOU HAVE ON A TYPICAL DAY: 10 OR MORE
HOW OFTEN DO YOU HAVE A DRINK CONTAINING ALCOHOL: 2-3 TIMES A WEEK

## 2023-01-25 ASSESSMENT — PAIN SCALES - GENERAL: PAINLEVEL_OUTOF10: 2

## 2023-01-25 ASSESSMENT — PAIN DESCRIPTION - LOCATION: LOCATION: ABDOMEN

## 2023-01-25 ASSESSMENT — PAIN - FUNCTIONAL ASSESSMENT: PAIN_FUNCTIONAL_ASSESSMENT: 0-10

## 2023-01-25 NOTE — ED NOTES
Patient instructed on use of leg bag and verbalized an understanding.      Osa Lennox, RN  01/25/23 9130

## 2023-01-25 NOTE — FLOWSHEET NOTE
Walk in to ED, private car. States discharged from hospital one week ago. Unable to urinate after Rausch taken out at noon today at NP office.

## 2023-01-25 NOTE — ED PROVIDER NOTES
EMERGENCY DEPARTMENT ENCOUNTER    Pt Name: Pradeep Pires  MRN: [id number]  [place] 1950  Date of evaluation: 1/25/23  CHIEF COMPLAINT       Chief Complaint   Patient presents with    Abdominal Pain    Urinary Retention     Walk in to ED, private car. States discharged from hospital one week ago. Unable to urinate after Rausch taken out at noon today at NP office. HISTORY OF PRESENT ILLNESS   65-year-old presents with urinary retention    Had a lobectomy for a pulmonary nodule    During the hospitalization found to have urinary retention and required Rausch catheterization. Had an appointment with the urologist about 13 hours ago and had the Rausch catheter removed    Has not been able to urinate since    Starting developed some mild suprapubic discomfort. No flank pain. No nausea vomiting fevers chills    Was not having any hematuria or sediment in the urine prior. Continues to take Flomax            REVIEW OF SYSTEMS     Review of Systems   Constitutional:  Negative for chills and fever. HENT:  Negative for rhinorrhea and sore throat. Eyes:  Negative for discharge and redness. Respiratory:  Negative for chest tightness and shortness of breath. Cardiovascular:  Negative for chest pain. Gastrointestinal:  Negative for abdominal pain, diarrhea, nausea and vomiting. Genitourinary:  Positive for decreased urine volume. Negative for dysuria and frequency. Musculoskeletal:  Negative for arthralgias and myalgias. Skin:  Negative for rash. Neurological:  Negative for weakness and numbness. Psychiatric/Behavioral:  Negative for suicidal ideas. All other systems reviewed and are negative.   PASTMEDICAL HISTORY     Past Medical History:   Diagnosis Date    Asthma     MILD    Chronic bronchitis (HCC)     MILD    COPD (chronic obstructive pulmonary disease) (HCC)     MILD    Diverticulosis     GERD (gastroesophageal reflux disease)     [place] (hard of hearing)     Hyperlipidemia Hypertension     PCP DR Jorge Paris    Pulmonary nodule     Snores     denies apnea    Vitiligo     Wears glasses     READING    Wears partial dentures     LOWER     Past Problem List  Patient Active Problem List   Diagnosis Code    Mixed hyperlipidemia E78.2    BMI 27.0-27.9,adult Z68.27    Hyperglycemia R73.9    Vitiligo L80    Benign prostatic hyperplasia with urinary obstruction N40.1, N13.8    Mild chronic obstructive pulmonary disease (HCC) J44.9    Simple chronic bronchitis (HCC) J41.0    Solitary pulmonary nodule R91.1     SURGICAL HISTORY       Past Surgical History:   Procedure Laterality Date    CARDIAC CATHETERIZATION  1990s    no blockage per patient    CATARACT REMOVAL Bilateral     CATARACTS WITH IOL PLACED. COLONOSCOPY      COLONOSCOPY N/A 6/27/2018    COLONOSCOPY POLYPECTOMY HOT performed by Silvia Sherman MD at [address] [place] [address]  10/6/2022    CT BIOPSY ABDOMEN RETROPERITONEUM    CT BIOPSY ABDOMEN RETROPERITONEUM  11/2/2022    CT BIOPSY ABDOMEN RETROPERITONEUM    CYST REMOVAL      neck POSTERIOR    PRE-MALIGNANT / BENIGN SKIN LESION EXCISION      UPPER LEFT POSTERIOR BACK.     PROSTATE BIOPSY  10/06/2020    PROSTATE BIOPSY N/A 10/6/2020    PROSTATE BIOPSY, ULTRASOUND (CONFIRMED W/ US-VERITO) performed by Amaya Kumari MD at [address] [place] [address]       Previous Medications    ALBUTEROL SULFATE HFA IN        ATORVASTATIN (LIPITOR) 40 MG TABLET    TAKE 1 TABLET BY MOUTH EVERY DAY    LOSARTAN (COZAAR) 100 MG TABLET    Take 1 tablet by mouth daily TAKE 1 TABLET BY MOUTH DAILY (TAKE WITH HYDROCHLOROTHIAZIDE 12.5 MG TABLETS)    MULTIPLE VITAMINS-MINERALS (THERAPEUTIC MULTIVITAMIN-MINERALS) TABLET    Take 1 tablet by mouth daily    OXYBUTYNIN (DITROPAN-XL) 10 MG EXTENDED RELEASE TABLET    Take 1 tablet by mouth daily    TAMSULOSIN (FLOMAX) 0.4 MG CAPSULE    TAKE 1 CAPSULE BY MOUTH EVERY DAY     ALLERGIES     is allergic to tetanus toxoids and tetanus immune globulin. FAMILY HISTORY     He indicated that his mother is . He indicated that his father is . He indicated that the status of his sister is unknown. SOCIAL HISTORY       Social History     Tobacco Use    Smoking status: Former     Packs/day: 1.00     Years: 49.00     Pack years: 49.00     Types: Cigarettes     Quit date:      Years since quittin.0    Smokeless tobacco: Never   Vaping Use    Vaping Use: Never used   Substance Use Topics    Alcohol use: Yes     Alcohol/week: 0.0 standard drinks     Comment: 12 pack  ON WEEKENDS    Drug use: No     PHYSICAL EXAM     INITIAL VITALS: /78   Pulse 84   Temp 97.7 °F (36.5 °C) (Oral)   Ht 5' 9\" (1.753 m)   Wt 190 lb (86.2 kg)   SpO2 94%   BMI 28.06 kg/m²    Physical Exam  Vitals and nursing note reviewed. Constitutional:       Appearance: Normal appearance. HENT:      Head: Normocephalic and atraumatic. Nose: Nose normal.      Mouth/Throat:      Mouth: Mucous membranes are moist.   Eyes:      Conjunctiva/sclera: Conjunctivae normal.      Pupils: Pupils are equal, round, and reactive to light. Cardiovascular:      Rate and Rhythm: Normal rate and regular rhythm. Pulses: Normal pulses. Heart sounds: Normal heart sounds. Pulmonary:      Effort: Pulmonary effort is normal.      Breath sounds: Normal breath sounds. Abdominal:      Palpations: Abdomen is soft. Tenderness: There is abdominal tenderness in the suprapubic area. Musculoskeletal:         General: No swelling or deformity. Cervical back: Normal range of motion. Skin:     General: Skin is warm. Findings: No rash. Neurological:      General: No focal deficit present. Mental Status: He is alert and oriented to person, place, and time.    Psychiatric:         Mood and Affect: Mood normal.       MEDICAL DECISION MAKING / ED COURSE:     76-year-old presents with urinary retention    Confirm a bladder scan will place Rausch catheter send urinalysis and discharge    1)  Number and Complexity of Problems Addressed at this Encounter  Problem List This Visit: Urinary retention    Differential Diagnosis: UTI, hematuria, BPH    Diagnoses Considered but Do Not Suspect: Kidney injury, sepsis    Pertinent Comorbid Conditions: BPH    2)  Data Reviewed and Analyzed  (Lab and radiology tests/orders below in next section)    External Documents Reviewed: Office visit yesterday where he had the Moeller catheter removed      3)  Treatment and Disposition    ED Course as of 01/25/23 0159   Wed Jan 25, 2023   0158 Microscopic Urinalysis(!):    WBC, UA 21 TO 50   RBC, UA 6 TO 9   Casts UA 6 TO 9   Epithelial Cells, UA 6 TO 9   Bacteria, UA MANY(!)   Mucus, UA 1+(!)  UTI [LENORE]      ED Course User Index  [LENORE] Ponce Bingham MD       Patient repeat assessment:  0200 resting comfortably improved after catheter placement    Disposition discussion with patient/family, Shared Decision Making:  Discussed UTI, discussed keeping moeller catheter in place and following up with urology, return if fever, chills, inability to urinate        CRITICAL CARE:       PROCEDURES:    Procedures      DATA FOR LAB AND RADIOLOGY TESTS ORDERED BELOW ARE REVIEWED BY THE ED CLINICIAN:    RADIOLOGY: All x-rays, CT, MRI, and formal ultrasound images (except ED bedside ultrasound) are read by the radiologist, see reports below, unless otherwise noted in MDM or here. Reports below are reviewed by myself. No orders to display       LABS: Lab orders shown below, the results are reviewed by myself, and all abnormals are listed below.   Labs Reviewed   URINALYSIS WITH REFLEX TO CULTURE - Abnormal; Notable for the following components:       Result Value    Turbidity UA Cloudy (*)     Urine Hgb SMALL (*)     Leukocyte Esterase, Urine SMALL (*)     All other components within normal limits   MICROSCOPIC URINALYSIS - Abnormal; Notable for the following components:    Bacteria, UA MANY (*)     Mucus, UA 1+ (*)     All other components within normal limits   CULTURE, URINE       Vitals Reviewed:    Vitals:    01/25/23 0051 01/25/23 0056   BP:  132/78   Pulse:  84   Temp: 97.7 °F (36.5 °C)    TempSrc: Oral    SpO2:  94%   Weight: 190 lb (86.2 kg)    Height: 5' 9\" (1.753 m)      MEDICATIONS GIVEN TO PATIENT THIS ENCOUNTER:  Orders Placed This Encounter   Medications    lidocaine (XYLOCAINE) 2 % uro-jet    lidocaine (XYLOCAINE) 2 % uro-jet     DELIA VICTORIA: cabinet override    cephALEXin (KEFLEX) capsule 500 mg     Order Specific Question:   Antimicrobial Indications     Answer:   Urinary Tract Infection    cephALEXin (KEFLEX) 500 MG capsule     Sig: Take 1 capsule by mouth 3 times daily for 7 days     Dispense:  21 capsule     Refill:  0     DISCHARGE PRESCRIPTIONS:  New Prescriptions    CEPHALEXIN (KEFLEX) 500 MG CAPSULE    Take 1 capsule by mouth 3 times daily for 7 days     PHYSICIAN CONSULTS ORDERED THIS ENCOUNTER:  None  FINAL IMPRESSION      1. Urinary retention    2.  Acute cystitis without hematuria          DISPOSITION/PLAN   DISPOSITION Decision To Discharge 01/25/2023 01:57:42 AM      OUTPATIENT FOLLOW UP THE PATIENT:  Jonny Francois, [address]  [address] [place] [address]  [phone]    Schedule an appointment as soon as possible for a visit       [facility]  Urology  [place] [address] [phone]  Schedule an appointment as soon as possible for a visit       MD Stacey Leal MD  01/25/23 0441

## 2023-01-26 ENCOUNTER — HOSPITAL ENCOUNTER (EMERGENCY)
Age: 73
Discharge: HOME | End: 2023-01-26
Attending: EMERGENCY MEDICINE
Payer: MEDICARE

## 2023-01-26 VITALS
DIASTOLIC BLOOD PRESSURE: 57 MMHG | HEIGHT: 69 IN | WEIGHT: 190 LBS | OXYGEN SATURATION: 96 % | TEMPERATURE: 98.4 F | HEART RATE: 99 BPM | SYSTOLIC BLOOD PRESSURE: 143 MMHG | RESPIRATION RATE: 18 BRPM | BODY MASS INDEX: 28.14 KG/M2

## 2023-01-26 DIAGNOSIS — R33.9 URINARY RETENTION: Primary | ICD-10-CM

## 2023-01-26 DIAGNOSIS — N30.01 ACUTE CYSTITIS WITH HEMATURIA: ICD-10-CM

## 2023-01-26 LAB
ABSOLUTE EOS #: 0 K/UL (ref 0–0.4)
ABSOLUTE LYMPH #: 1.55 K/UL (ref 1–4.8)
ABSOLUTE MONO #: 1.55 K/UL (ref 0.1–1.3)
ALBUMIN SERPL-MCNC: 3.9 G/DL (ref 3.5–5.2)
ALP BLD-CCNC: 83 U/L (ref 40–129)
ALT SERPL-CCNC: 34 U/L (ref 5–41)
ANION GAP SERPL CALCULATED.3IONS-SCNC: 11 MMOL/L (ref 9–17)
AST SERPL-CCNC: 18 U/L
BACTERIA: ABNORMAL
BASOPHILS # BLD: 0 % (ref 0–2)
BASOPHILS ABSOLUTE: 0 K/UL (ref 0–0.2)
BILIRUB SERPL-MCNC: 1.1 MG/DL (ref 0.3–1.2)
BILIRUBIN URINE: NEGATIVE
BUN BLDV-MCNC: 19 MG/DL (ref 8–23)
CALCIUM SERPL-MCNC: 8.7 MG/DL (ref 8.6–10.4)
CASTS UA: ABNORMAL /LPF
CASTS UA: ABNORMAL /LPF
CHLORIDE BLD-SCNC: 96 MMOL/L (ref 98–107)
CO2: 23 MMOL/L (ref 20–31)
COLOR: YELLOW
CREAT SERPL-MCNC: 0.73 MG/DL (ref 0.7–1.2)
EOSINOPHILS RELATIVE PERCENT: 0 % (ref 0–4)
EPITHELIAL CELLS UA: ABNORMAL /HPF
GFR SERPL CREATININE-BSD FRML MDRD: >60 ML/MIN/1.73M2
GLUCOSE BLD-MCNC: 106 MG/DL (ref 70–99)
GLUCOSE URINE: NEGATIVE
HCT VFR BLD CALC: 42.1 % (ref 41–53)
HEMOGLOBIN: 14.4 G/DL (ref 13.5–17.5)
KETONES, URINE: NEGATIVE
LEUKOCYTE ESTERASE, URINE: ABNORMAL
LYMPHOCYTES # BLD: 9 % (ref 24–44)
MAGNESIUM: 2.1 MG/DL (ref 1.6–2.6)
MCH RBC QN AUTO: 31.2 PG (ref 26–34)
MCHC RBC AUTO-ENTMCNC: 34.1 G/DL (ref 31–37)
MCV RBC AUTO: 91.4 FL (ref 80–100)
MONOCYTES # BLD: 9 % (ref 1–7)
MORPHOLOGY: NORMAL
NITRITE, URINE: NEGATIVE
PDW BLD-RTO: 14.6 % (ref 11.5–14.9)
PH UA: 6 (ref 5–8)
PLATELET # BLD: 300 K/UL (ref 150–450)
PMV BLD AUTO: 6.6 FL (ref 6–12)
POTASSIUM SERPL-SCNC: 4 MMOL/L (ref 3.7–5.3)
PROTEIN UA: NEGATIVE
RBC # BLD: 4.61 M/UL (ref 4.5–5.9)
RBC UA: ABNORMAL /HPF
SEG NEUTROPHILS: 82 % (ref 36–66)
SEGMENTED NEUTROPHILS ABSOLUTE COUNT: 14.1 K/UL (ref 1.3–9.1)
SODIUM BLD-SCNC: 130 MMOL/L (ref 135–144)
SPECIFIC GRAVITY UA: 1.01 (ref 1–1.03)
TOTAL PROTEIN: 6.9 G/DL (ref 6.4–8.3)
TURBIDITY: CLEAR
URINE HGB: ABNORMAL
UROBILINOGEN, URINE: NORMAL
WBC # BLD: 17.2 K/UL (ref 3.5–11)
WBC UA: ABNORMAL /HPF

## 2023-01-26 PROCEDURE — 81001 URINALYSIS AUTO W/SCOPE: CPT

## 2023-01-26 PROCEDURE — 85025 COMPLETE CBC W/AUTO DIFF WBC: CPT

## 2023-01-26 PROCEDURE — 36415 COLL VENOUS BLD VENIPUNCTURE: CPT

## 2023-01-26 PROCEDURE — 51798 US URINE CAPACITY MEASURE: CPT

## 2023-01-26 PROCEDURE — 87086 URINE CULTURE/COLONY COUNT: CPT

## 2023-01-26 PROCEDURE — 83735 ASSAY OF MAGNESIUM: CPT

## 2023-01-26 PROCEDURE — 99283 EMERGENCY DEPT VISIT LOW MDM: CPT

## 2023-01-26 PROCEDURE — 80053 COMPREHEN METABOLIC PANEL: CPT

## 2023-01-26 ASSESSMENT — ENCOUNTER SYMPTOMS
ABDOMINAL PAIN: 1
SHORTNESS OF BREATH: 0
BACK PAIN: 0
COLOR CHANGE: 0
EYE PAIN: 0

## 2023-01-26 ASSESSMENT — PAIN SCALES - GENERAL: PAINLEVEL_OUTOF10: 3

## 2023-01-26 ASSESSMENT — PAIN - FUNCTIONAL ASSESSMENT: PAIN_FUNCTIONAL_ASSESSMENT: 0-10

## 2023-01-26 NOTE — ED PROVIDER NOTES
EMERGENCY DEPARTMENT ENCOUNTER    Pt Name: Nayely Banuelos  MRN: [id number]  [place] 1950  Date of evaluation: 1/26/23  CHIEF COMPLAINT       Chief Complaint   Patient presents with    Urinary Retention     HISTORY OF PRESENT ILLNESS   74-year-old male presents for urinary retention. Patient reports that this has been an issue for him recently states it started after he had surgery on his lung. He reports that he was here recently and had Rausch put in secondary to urinary retention states he was seen at his PCP office today and the Rausch was removed this morning. He reports since that time has been unable to urinate and has developed worsening lower abdominal discomfort. He denies any associated fevers, chills, chest pain or shortness of breath denies any nausea or vomiting. Does have a history of BPH    The history is provided by the patient. REVIEW OF SYSTEMS     Review of Systems   Constitutional:  Negative for chills and fever. HENT:  Negative for congestion and ear pain. Eyes:  Negative for pain. Respiratory:  Negative for shortness of breath. Cardiovascular:  Negative for chest pain, palpitations and leg swelling. Gastrointestinal:  Positive for abdominal pain. Genitourinary:  Positive for difficulty urinating. Negative for dysuria and flank pain. Musculoskeletal:  Negative for back pain. Skin:  Negative for color change. Neurological:  Negative for numbness and headaches. Psychiatric/Behavioral:  Negative for confusion. All other systems reviewed and are negative.   PASTMEDICAL HISTORY     Past Medical History:   Diagnosis Date    Asthma     MILD    Chronic bronchitis (HCC)     MILD    COPD (chronic obstructive pulmonary disease) (HCC)     MILD    Diverticulosis     GERD (gastroesophageal reflux disease)     [place] (hard of hearing)     Hyperlipidemia     Hypertension     PCP DR Christiana Mullen    Pulmonary nodule     Snores     denies apnea    Vitiligo     Wears glasses     READING    Wears partial dentures     LOWER     Past Problem List  Patient Active Problem List   Diagnosis Code    Mixed hyperlipidemia E78.2    BMI 27.0-27.9,adult Z68.27    Hyperglycemia R73.9    Vitiligo L80    Benign prostatic hyperplasia with urinary obstruction N40.1, N13.8    Mild chronic obstructive pulmonary disease (HCC) J44.9    Simple chronic bronchitis (HCC) J41.0    Solitary pulmonary nodule R91.1     SURGICAL HISTORY       Past Surgical History:   Procedure Laterality Date    CARDIAC CATHETERIZATION  1990s    no blockage per patient    CATARACT REMOVAL Bilateral     CATARACTS WITH IOL PLACED. COLONOSCOPY      COLONOSCOPY N/A 6/27/2018    COLONOSCOPY POLYPECTOMY HOT performed by Aj Sandoval MD at [address] [place] [address]  10/6/2022    CT BIOPSY ABDOMEN RETROPERITONEUM    CT BIOPSY ABDOMEN RETROPERITONEUM  11/2/2022    CT BIOPSY ABDOMEN RETROPERITONEUM    CYST REMOVAL      neck POSTERIOR    PRE-MALIGNANT / BENIGN SKIN LESION EXCISION      UPPER LEFT POSTERIOR BACK.     PROSTATE BIOPSY  10/06/2020    PROSTATE BIOPSY N/A 10/6/2020    PROSTATE BIOPSY, ULTRASOUND (CONFIRMED W/ US-VERITO) performed by Emerald Murray MD at [place] [address]       Discharge Medication List as of 1/26/2023  7:25 PM        CONTINUE these medications which have NOT CHANGED    Details   cephALEXin (KEFLEX) 500 MG capsule Take 1 capsule by mouth 3 times daily for 7 days, Disp-21 capsule, R-0Normal      tamsulosin (FLOMAX) 0.4 MG capsule TAKE 1 CAPSULE BY MOUTH EVERY DAY, Disp-90 capsule, R-1Normal      atorvastatin (LIPITOR) 40 MG tablet TAKE 1 TABLET BY MOUTH EVERY DAY, Disp-90 tablet, R-3Normal      Multiple Vitamins-Minerals (THERAPEUTIC MULTIVITAMIN-MINERALS) tablet Take 1 tablet by mouth dailyHistorical Med      ALBUTEROL SULFATE HFA IN Historical Med      losartan (COZAAR) 100 MG tablet Take 1 tablet by mouth daily TAKE 1 TABLET BY MOUTH DAILY (TAKE WITH HYDROCHLOROTHIAZIDE 12.5 MG TABLETS), Disp-90 tablet, R-3Normal           ALLERGIES     is allergic to tetanus toxoids and tetanus immune globulin. FAMILY HISTORY     He indicated that his mother is . He indicated that his father is . He indicated that the status of his sister is unknown. SOCIAL HISTORY       Social History     Tobacco Use    Smoking status: Former     Packs/day: 1.00     Years: 49.00     Pack years: 49.00     Types: Cigarettes     Quit date:      Years since quittin.0    Smokeless tobacco: Never   Vaping Use    Vaping Use: Never used   Substance Use Topics    Alcohol use: Yes     Alcohol/week: 0.0 standard drinks     Comment: 12 pack  ON WEEKENDS    Drug use: No     PHYSICAL EXAM     INITIAL VITALS: BP (!) 143/57   Pulse 99   Temp 98.4 °F (36.9 °C)   Resp 18   Ht 5' 9\" (1.753 m)   Wt 190 lb (86.2 kg)   SpO2 96%   BMI 28.06 kg/m²    Physical Exam  Vitals and nursing note reviewed. Constitutional:       General: He is not in acute distress. Appearance: Normal appearance. He is not ill-appearing. HENT:      Head: Normocephalic and atraumatic. Right Ear: External ear normal.      Left Ear: External ear normal.      Nose: Nose normal.      Mouth/Throat:      Mouth: Mucous membranes are moist.   Eyes:      Extraocular Movements: Extraocular movements intact. Pupils: Pupils are equal, round, and reactive to light. Cardiovascular:      Rate and Rhythm: Normal rate and regular rhythm. Pulses: Normal pulses. Heart sounds: Normal heart sounds. Pulmonary:      Effort: Pulmonary effort is normal.      Breath sounds: Normal breath sounds. Abdominal:      General: Abdomen is flat. Palpations: Abdomen is soft. Tenderness: There is abdominal tenderness in the suprapubic area. Musculoskeletal:         General: No tenderness. Normal range of motion. Cervical back: Neck supple. No spinous process tenderness or muscular tenderness. Skin:     General: Skin is warm and dry. Capillary Refill: Capillary refill takes less than 2 seconds. Neurological:      General: No focal deficit present. Mental Status: He is alert and oriented to person, place, and time. Cranial Nerves: Cranial nerves 2-12 are intact. Sensory: Sensation is intact. Motor: Motor function is intact. Psychiatric:         Behavior: Behavior normal.         Thought Content: Thought content does not include homicidal or suicidal ideation. MEDICAL DECISION MAKING / ED COURSE:         1)  Number and Complexity of Problems Addressed at this Encounter  Problem List This Visit:  Urinary Retention    Differential Diagnosis: Urinary Rentention, Acute Cystitis, BPH      Pertinent Comorbid Conditions:  Hx recent urinary retention, history of BPH    2)  Data Reviewed and Analyzed  (Lab and radiology tests/orders below in next section)    External Documents Reviewed: Visits were reviewed patient was treated for UTI he reports he still is currently on antibiotic      3)  Treatment and Disposition         42-year-old male presents for urinary retention.   On initial exam patient in no acute distress vitals are stable, abdomen is soft he does have some suprapubic tenderness we will check labs we will recheck urine we will check bladder scan    Bladder scan was performed patient noted to have over 700 cc of urine in the bladder we will place Rausch    Labs reviewed he was noted to have a white blood count of 17 suspect this is likely reactive as well as related to his known history of UTI, creatinine was stable    UA was consistent with UTI patient is currently on antibiotics, due to the patient is stable for outpatient treatment at this time as he is stable vital signs he is alert and oriented, feeling better after Rausch insertion    Patient was reevaluated he reports symptoms have resolved since insertion of Rausch discussed admission for treatment versus outpatient treatment patient wishes to go home at this time, discussed need for follow-up with PCP, urology and return precautions, patient voiced understanding is comfortable plan to discharge    Patient/Guardian was informed of their diagnosis and told to follow up with PCP & urology in 1-3 days. Patient demonstrates understanding and agreement with the plan. They were given the opportunity to ask questions and those questions were answered to the best of our ability with the available information. Patient/Guardian told to return to the ED for any new, worsening, changing or persistent symptoms. This dictation was prepared using [employer] voice recognition software. CRITICAL CARE:       PROCEDURES:    Procedures      DATA FOR LAB AND RADIOLOGY TESTS ORDERED BELOW ARE REVIEWED BY THE ED CLINICIAN:    RADIOLOGY: All x-rays, CT, MRI, and formal ultrasound images (except ED bedside ultrasound) are read by the radiologist, see reports below, unless otherwise noted in MDM or here. Reports below are reviewed by myself. No orders to display       LABS: Lab orders shown below, the results are reviewed by myself, and all abnormals are listed below.   Labs Reviewed   CBC WITH AUTO DIFFERENTIAL - Abnormal; Notable for the following components:       Result Value    WBC 17.2 (*)     Seg Neutrophils 82 (*)     Lymphocytes 9 (*)     Monocytes 9 (*)     Segs Absolute 14.10 (*)     Absolute Mono # 1.55 (*)     All other components within normal limits   COMPREHENSIVE METABOLIC PANEL - Abnormal; Notable for the following components:    Glucose 106 (*)     Sodium 130 (*)     Chloride 96 (*)     All other components within normal limits   URINALYSIS WITH REFLEX TO CULTURE - Abnormal; Notable for the following components:    Urine Hgb LARGE (*)     Leukocyte Esterase, Urine MOD (*)     All other components within normal limits   MICROSCOPIC URINALYSIS - Abnormal; Notable for the following components:    Bacteria, UA FEW (*)     All other components within normal limits   CULTURE, URINE   MAGNESIUM       Vitals Reviewed:    Vitals:    01/26/23 1741 01/26/23 1742   BP:  (!) 143/57   Pulse: 99    Resp: 18    Temp:  98.4 °F (36.9 °C)   SpO2: 96%    Weight: 190 lb (86.2 kg)    Height: 5' 9\" (1.753 m)      MEDICATIONS GIVEN TO PATIENT THIS ENCOUNTER:  No orders of the defined types were placed in this encounter. DISCHARGE PRESCRIPTIONS:  Discharge Medication List as of 1/26/2023  7:25 PM        PHYSICIAN CONSULTS ORDERED THIS ENCOUNTER:  None  FINAL IMPRESSION      1. Urinary retention    2.  Acute cystitis with hematuria          DISPOSITION/PLAN   DISPOSITION Decision To Discharge 01/26/2023 07:24:41 PM      OUTPATIENT FOLLOW UP THE PATIENT:  Jones Knott, [address]  [address] [place] [address]  [phone]    Schedule an appointment as soon as possible for a visit       [facility]  [address]  [address] [facility]  [phone]    As needed, If symptoms worsen    Pascale Gonzales MD  [facility] [address] [facility]  [phone]    Schedule an appointment as soon as possible for a visit       Dante Brambila, [address] [facility],   01/26/23 4632

## 2023-01-26 NOTE — ED TRIAGE NOTES
Mode of arrival (squad #, walk in, police, etc) : Walk in         Chief complaint(s): Urinary Retention         Arrival Note (brief scenario, treatment PTA, etc). : Patient states he had a urinary catheter removed this afternoon around noon and since then he has been unable to urinate and has a strong urge to go        C= \"Have you ever felt that you should Cut down on your drinking? \"  No  A= \"Have people Annoyed you by criticizing your drinking? \"  No  G= \"Have you ever felt bad or Guilty about your drinking? \"  No  E= \"Have you ever had a drink as an Eye-opener first thing in the morning to steady your nerves or to help a hangover? \"  No      Deferred []      Reason for deferring: N/A    *If yes to two or more: probable alcohol abuse. *

## 2023-01-27 LAB
CULTURE: NO GROWTH
SPECIMEN DESCRIPTION: NORMAL

## 2023-01-27 NOTE — ED NOTES
Report given to Lorena Casanova from ED. Report method in person   The following was reviewed with receiving RN:   Current vital signs:  BP (!) 143/57   Pulse 99   Temp 98.4 °F (36.9 °C)   Resp 18   Ht 5' 9\" (1.753 m)   Wt 190 lb (86.2 kg)   SpO2 96%   BMI 28.06 kg/m²                      Any medication or safety alerts were reviewed. Any pending diagnostics and notifications were also reviewed, as well as any safety concerns or issues, abnormal labs, abnormal imaging, and abnormal assessment findings. Questions were answered.             Kimber Hernandez  01/26/23 1922

## 2023-01-29 LAB
CULTURE: ABNORMAL
SPECIMEN DESCRIPTION: ABNORMAL

## 2023-01-30 ENCOUNTER — HOSPITAL ENCOUNTER (OUTPATIENT)
Dept: GENERAL RADIOLOGY | Age: 73
Discharge: HOME | End: 2023-02-01
Payer: MEDICARE

## 2023-01-30 ENCOUNTER — HOSPITAL ENCOUNTER (OUTPATIENT)
Age: 73
Discharge: HOME | End: 2023-02-01
Payer: MEDICARE

## 2023-01-30 DIAGNOSIS — R91.1 NODULE OF UPPER LOBE OF LEFT LUNG: ICD-10-CM

## 2023-01-30 PROCEDURE — 71046 X-RAY EXAM CHEST 2 VIEWS: CPT

## 2023-02-02 ENCOUNTER — TELEPHONE (OUTPATIENT)
Dept: UROLOGY | Age: 73
End: 2023-02-02

## 2023-02-02 ENCOUNTER — OFFICE VISIT (OUTPATIENT)
Dept: UROLOGY | Age: 73
End: 2023-02-02
Payer: MEDICARE

## 2023-02-02 VITALS
HEART RATE: 72 BPM | WEIGHT: 190 LBS | SYSTOLIC BLOOD PRESSURE: 118 MMHG | BODY MASS INDEX: 28.14 KG/M2 | DIASTOLIC BLOOD PRESSURE: 68 MMHG | HEIGHT: 69 IN

## 2023-02-02 DIAGNOSIS — N40.1 BENIGN PROSTATIC HYPERPLASIA WITH URINARY RETENTION: Primary | ICD-10-CM

## 2023-02-02 DIAGNOSIS — Z97.8 FOLEY CATHETER IN PLACE: ICD-10-CM

## 2023-02-02 DIAGNOSIS — Z87.898 HISTORY OF ELEVATED PSA: ICD-10-CM

## 2023-02-02 DIAGNOSIS — R33.8 BENIGN PROSTATIC HYPERPLASIA WITH URINARY RETENTION: Primary | ICD-10-CM

## 2023-02-02 PROCEDURE — 1123F ACP DISCUSS/DSCN MKR DOCD: CPT | Performed by: NURSE PRACTITIONER

## 2023-02-02 PROCEDURE — 99214 OFFICE O/P EST MOD 30 MIN: CPT | Performed by: NURSE PRACTITIONER

## 2023-02-02 ASSESSMENT — ENCOUNTER SYMPTOMS
RESPIRATORY NEGATIVE: 1
SHORTNESS OF BREATH: 0
EYE PAIN: 0
EYES NEGATIVE: 1
DIARRHEA: 0
VOMITING: 0
CONSTIPATION: 0
ABDOMINAL PAIN: 0
BACK PAIN: 0
GASTROINTESTINAL NEGATIVE: 1
EYE REDNESS: 0
COUGH: 0
NAUSEA: 0
WHEEZING: 0

## 2023-02-02 NOTE — PROGRESS NOTES
[address] [address]  Dept: 92 [address] [facility] Urology Office Note - Established    Patient:  Wilbert Alford  YOB: 1950  Date: 2/2/2023    The patient is a 67 y.o. male who presents todayfor evaluation of the following problems:   Chief Complaint   Patient presents with    Urinary Retention     1 week w/ PVR       HPI  Patient is presenting for follow-up of urinary retention. Retention initially started after he had a lobectomy by Dr. Timi Acuna at [facility]  He came into the office on 1/24/2023 for cath removal.  Unfortunately he returned to the ER and urinary retention the following day. Patient then went to see his PCP, Rausch was removed once again in their office. He returned to the ER later that day for urinary retention. He presents to the office today with Rausch in place, and to discuss the plan of care moving forward. Currently being treated for urinary tract infection. He does continue Flomax, he is no longer on oxybutynin. He denies any constipation, he is drinking a lot of sparkling ice. He denies any hematuria, dysuria, flank pain, fever or chills. Denies perineal pain. Last PSA was October 2022, 1.18. Summary of old records: N/A    Additional History: N/A    Procedures Today: N/A    Urinalysis today:  No results found for this visit on 02/02/23.   Last several PSA's:  Lab Results   Component Value Date    PSA 1.18 10/20/2022    PSA 1.14 04/05/2022    PSA 0.99 10/04/2021     Last total testosterone:  Lab Results   Component Value Date    TESTOSTERONE 367 07/22/2013         Last BUN and creatinine:  Lab Results   Component Value Date    BUN 19 01/26/2023     Lab Results   Component Value Date    CREATININE 0.73 01/26/2023       Additional Lab/Culture results: none    Imaging Reviewed during this Office Visit: none  (results were independently reviewed by physician and radiology report verified)    PAST MEDICAL, FAMILY AND SOCIAL HISTORY UPDATE:  Past Medical History:   Diagnosis Date    Asthma     MILD    Chronic bronchitis (HCC)     MILD    COPD (chronic obstructive pulmonary disease) ([address]     MILD    Diverticulosis     GERD (gastroesophageal reflux disease)     [place] (hard of hearing)     Hyperlipidemia     Hypertension     PCP DR Ky Avelar    Pulmonary nodule     Snores     denies apnea    Vitiligo     Wears glasses     READING    Wears partial dentures     LOWER     Past Surgical History:   Procedure Laterality Date    CARDIAC CATHETERIZATION  1990s    no blockage per patient    CATARACT REMOVAL Bilateral     CATARACTS WITH IOL PLACED. COLONOSCOPY      COLONOSCOPY N/A 6/27/2018    COLONOSCOPY POLYPECTOMY HOT performed by Daxa Marie MD at [address] [place] [address]  10/6/2022    CT BIOPSY ABDOMEN RETROPERITONEUM    CT BIOPSY ABDOMEN RETROPERITONEUM  11/2/2022    CT BIOPSY ABDOMEN RETROPERITONEUM    CYST REMOVAL      neck POSTERIOR    PRE-MALIGNANT / BENIGN SKIN LESION EXCISION      UPPER LEFT POSTERIOR BACK.     PROSTATE BIOPSY  10/06/2020    PROSTATE BIOPSY N/A 10/6/2020    PROSTATE BIOPSY, ULTRASOUND (CONFIRMED W/ US-VERITO) performed by Aaron Maxwell MD at [address] [place] [address] History   Problem Relation Age of Onset    Prostate Cancer Father         WITH METS TO BONE    Lung Cancer Mother         LUNG    Other Sister         diverticular disease     Outpatient Medications Marked as Taking for the 2/2/23 encounter (Office Visit) with JARRED Lopez CNP   Medication Sig Dispense Refill    tamsulosin (FLOMAX) 0.4 MG capsule TAKE 1 CAPSULE BY MOUTH EVERY DAY 90 capsule 1    atorvastatin (LIPITOR) 40 MG tablet TAKE 1 TABLET BY MOUTH EVERY DAY 90 tablet 3    Multiple Vitamins-Minerals (THERAPEUTIC MULTIVITAMIN-MINERALS) tablet Take 1 tablet by mouth daily      ALBUTEROL SULFATE HFA IN       losartan (COZAAR) 100 MG tablet Take 1 tablet by mouth daily TAKE 1 TABLET BY MOUTH DAILY (TAKE WITH HYDROCHLOROTHIAZIDE 12.5 MG TABLETS) 90 tablet 3       Tetanus toxoids and Tetanus immune globulin  Social History     Tobacco Use   Smoking Status Former    Packs/day: 1.00    Years: 49.00    Pack years: 49.00    Types: Cigarettes    Quit date:     Years since quittin.0   Smokeless Tobacco Never     (Ifpatient a smoker, smoking cessation counseling offered)    Social History     Substance and Sexual Activity   Alcohol Use Yes    Alcohol/week: 0.0 standard drinks    Comment: 12 pack  ON WEEKENDS       REVIEW OF SYSTEMS:  Review of Systems    Physical Exam:      Vitals:    23 1050   BP: 118/68   Pulse: 72     Body mass index is 28.06 kg/m². Patient is a 67 y.o. male in no acute distress and alert and oriented to person, place and time. Physical Exam  Constitutional: Patient in no acute distress. Neuro: Alert and oriented to person, place and time. Psych: Mood normal, affect normal  Skin: No rash noted  Lungs: Respiratory effort is normal  Cardiovascular: Warm & Pink  Abdomen: Soft, non-tender, non-distended   Bladder non-tender and not distended. Rausch in place draining clear, yellow urine. Musculoskeletal: Normal gait and station      Assessment and Plan      1. Benign prostatic hyperplasia with urinary retention    2. Rausch catheter in place    3. History of elevated PSA           Plan:     Urinary retention continues. Has failed 2 void trials. Rausch in place, will continue. Will need cysto in office, next available. Procedure and risks discussed. Pt verbalized understanding and agreeable. Continue flomax. OAB meds were stopped last appt. Rausch care reviewed, leg strap and cath plug provided. Call with any new/worsening urinary symptoms, questions or concerns. Return for Cysto, Dr. Shell Davies. Prescriptions Ordered:  No orders of the defined types were placed in this encounter.     Orders Placed:  No orders of the defined types were placed in this encounter. JARRED Alvarado CNP    Reviewed and agree with the ROS entered by the MA.

## 2023-02-02 NOTE — PROGRESS NOTES
Review of Systems   Constitutional:  Negative for appetite change, chills and fatigue. Eyes: Negative. Negative for pain, redness and visual disturbance. Respiratory: Negative. Negative for cough, shortness of breath and wheezing. Cardiovascular: Negative. Negative for chest pain and leg swelling. Gastrointestinal: Negative. Negative for abdominal pain, constipation, diarrhea, nausea and vomiting. Genitourinary: Negative. Negative for difficulty urinating, dysuria, flank pain, frequency, hematuria and urgency. Musculoskeletal: Negative. Negative for back pain, joint swelling and myalgias. Skin: Negative. Negative for rash and wound. Neurological:  Negative for dizziness, weakness and numbness. Hematological: Negative. Does not bruise/bleed easily.    Rausch in place

## 2023-02-03 ENCOUNTER — PATIENT MESSAGE (OUTPATIENT)
Dept: UROLOGY | Age: 73
End: 2023-02-03

## 2023-02-21 ENCOUNTER — PROCEDURE VISIT (OUTPATIENT)
Dept: UROLOGY | Age: 73
End: 2023-02-21
Payer: MEDICARE

## 2023-02-21 ENCOUNTER — HOSPITAL ENCOUNTER (OUTPATIENT)
Age: 73
Setting detail: SPECIMEN
Discharge: HOME | End: 2023-02-21

## 2023-02-21 VITALS — WEIGHT: 190 LBS | HEIGHT: 69 IN | BODY MASS INDEX: 28.14 KG/M2

## 2023-02-21 DIAGNOSIS — R30.0 DYSURIA: Primary | ICD-10-CM

## 2023-02-21 DIAGNOSIS — R33.9 URINARY RETENTION: ICD-10-CM

## 2023-02-21 DIAGNOSIS — R39.14 BENIGN PROSTATIC HYPERPLASIA WITH INCOMPLETE BLADDER EMPTYING: ICD-10-CM

## 2023-02-21 DIAGNOSIS — N40.1 BENIGN PROSTATIC HYPERPLASIA WITH INCOMPLETE BLADDER EMPTYING: ICD-10-CM

## 2023-02-21 DIAGNOSIS — R30.0 DYSURIA: ICD-10-CM

## 2023-02-21 PROCEDURE — 52000 CYSTOURETHROSCOPY: CPT | Performed by: UROLOGY

## 2023-02-21 NOTE — PROGRESS NOTES
Moeller Change and Insertion Procedure:  Risks and Benefits discussed with patient prior to procedure. Placement Date: 2/21/23    Verbal consent obtained from patient prior to procedure. Lidocaine 2% 100 mg Jelly inserted into urethra. New 16F moeller inserted utilizing sterile technique per organization policy placed by Meredith Duggan MA.  10ml sterile water balloon inflated, attached to leg bag positive with return of yellow urine. Moeller secured. Patient tolerated procedure well and without complications. Reviewed moeller care. Verbalized understanding.

## 2023-02-21 NOTE — PROGRESS NOTES
Cystoscopy Operative Note (2/21/23)  Surgeon: Cee Ramos MD  Anesthesia: Urethral 2% Xylocaine   Indications: Urinary Retention  Position: Dorsal Lithotomy    Findings:   Risks and Benefits discussed with patient prior to procedure. The patient was prepped and draped in the usual sterile fashion. The flexible cystoscope was advanced through the urethra and into the bladder. The bladder was thoroughly inspected and the following was noted:    Residual Urine: mild  Urethra: normal appearing urethra with no masses, tenderness or lesions  Prostate: partially obstructing lateral lobes of prostate; median lobe present? no.   Bladder: No tumors or CIS noted. No bladder diverticulum. There was moderate trabeculation noted. Ureters: Clear efflux from both ureters. Orifices with normal configuration and location. The cystoscope was removed. The patient tolerated the procedure well. Cysto greenlight at [place] [id number] with the ROS entered by the MA.

## 2023-02-24 LAB
MICROORGANISM SPEC CULT: ABNORMAL
SPECIMEN DESCRIPTION: ABNORMAL

## 2023-02-28 DIAGNOSIS — N30.00 ACUTE CYSTITIS WITHOUT HEMATURIA: Primary | ICD-10-CM

## 2023-02-28 RX ORDER — SULFAMETHOXAZOLE AND TRIMETHOPRIM 800; 160 MG/1; MG/1
1 TABLET ORAL 2 TIMES DAILY
Qty: 20 TABLET | Refills: 0 | Status: SHIPPED | OUTPATIENT
Start: 2023-02-28 | End: 2023-03-10

## 2023-02-28 NOTE — PROGRESS NOTES
Message  Received: GERARD Jacob APRN - CNP         Previous Messages   Contains abnormal data Culture, Urine  Order: [id number]  Status: Final result    Visible to patient: Yes (seen)    Dx: Dysuria    Specimen Information: Urine, clean catch   1 Result Note  Component 7 d ago    Specimen Description . CLEAN CATCH URINE    Culture ENTEROBACTER CLOACAE COMPLEX >[id number] CFU/ML Abnormal     Resulting Agency [address]        Susceptibility     Enterobacter cloacae complex     BACTERIAL SUSCEPTIBILITY PANEL AREN BACTERIAL SUSCEPTIBILITY PANEL WILSON GAN     cefTRIAXone   Sensitive     gentamicin <=1  Sensitive      imipenem <=0.25  Sensitive      levofloxacin <=0.12  Sensitive      nitrofurantoin 32  Sensitive      piperacillin-tazobactam <=4  Sensitive      tobramycin <=1  Sensitive      trimethoprim-sulfamethoxazole <=20  Sensitive                     Specimen Collected: 02/21/23 21:29 EST Last Resulted: 02/24/23 15:31 EST        Lab Flowsheet     Order Details     View Encounter     Lab and Collection Details     Routing     Result History     View Encounter Conversation           Result Care Coordination      Result Notes   JARRED Jama CNP   2/28/2023  1:47 PM EST Back to Top      eGFR >60  No atb allergies. Awaiting GLL procedure. Will send bactrim BID x 10 days, please notify patient. He should get repeat urine culture in pre-op testing.

## 2023-03-20 ENCOUNTER — TELEPHONE (OUTPATIENT)
Dept: UROLOGY | Age: 73
End: 2023-03-20

## 2023-03-20 NOTE — TELEPHONE ENCOUNTER
esther Delacruz @ [facility] 4/25/23 2:30pm **STOP BLOOD THINNERS 4/18/23**   PAT @ [facility] 4/11/23 10:30am         Spoke with patient, procedure info given to patient

## 2023-03-21 ENCOUNTER — PROCEDURE VISIT (OUTPATIENT)
Dept: UROLOGY | Age: 73
End: 2023-03-21
Payer: MEDICARE

## 2023-03-21 VITALS
WEIGHT: 190 LBS | HEIGHT: 69 IN | TEMPERATURE: 98.3 F | BODY MASS INDEX: 28.14 KG/M2 | HEART RATE: 68 BPM | SYSTOLIC BLOOD PRESSURE: 128 MMHG | DIASTOLIC BLOOD PRESSURE: 84 MMHG

## 2023-03-21 DIAGNOSIS — R33.9 URINARY RETENTION: Primary | ICD-10-CM

## 2023-03-21 PROCEDURE — 51702 INSERT TEMP BLADDER CATH: CPT | Performed by: NURSE PRACTITIONER

## 2023-03-21 NOTE — PROGRESS NOTES
Moeller Change and Insertion Procedure:  Risks and Benefits discussed with patient prior to procedure. Placement Date: 3/21/23    Verbal consent obtained from patient prior to procedure. Patient arrived with old urethral catheter in place, balloon deflated and was removed without complication. Lidocaine 2% 100 mg Jelly inserted into urethra. New 16F moeller inserted utilizing sterile technique per organization policy placed by Rylee Rodriguez MA.  10ml sterile water balloon inflated, attached to leg bag with positive return of yellow urine. Moeller secured. Patient tolerated procedure well and without complications. Reviewed moeller care. Verbalized understanding.

## 2023-04-11 PROBLEM — Z01.818 PREOP EXAMINATION: Status: ACTIVE | Noted: 2023-04-11

## 2023-04-14 ENCOUNTER — TELEPHONE (OUTPATIENT)
Dept: PREADMISSION TESTING | Age: 73
End: 2023-04-14

## 2023-04-17 DIAGNOSIS — N30.00 ACUTE CYSTITIS WITHOUT HEMATURIA: Primary | ICD-10-CM

## 2023-04-17 RX ORDER — CEPHALEXIN 500 MG/1
500 CAPSULE ORAL 3 TIMES DAILY
Qty: 30 CAPSULE | Refills: 0 | Status: SHIPPED | OUTPATIENT
Start: 2023-04-17 | End: 2023-04-27

## 2023-04-17 NOTE — PROGRESS NOTES
of UTI. No atb allergies  Cr 0.6  Will send keflex, take entire course.         Patient Communication     Add Comments   Seen Back to Top

## 2023-04-24 ENCOUNTER — ANESTHESIA EVENT (OUTPATIENT)
Dept: OPERATING ROOM | Age: 73
End: 2023-04-24
Payer: MEDICARE

## 2023-04-24 PROBLEM — R91.1 SOLITARY PULMONARY NODULE: Status: RESOLVED | Noted: 2022-04-06 | Resolved: 2023-04-24

## 2023-04-24 NOTE — PRE-PROCEDURE INSTRUCTIONS
No answer, left message ? Unable to leave message ? When were you told to arrive at hospital ?  1230    Do you have a  ?y    Are you on any blood thinners ?         n            If yes when did you stop taking ? Do you have your prep Rx filled and instruction ? Nothing to eat the day before , only clear liquids. Are you experiencing any covid symptoms ? n    Do you have any infections or rash we should be aware of ?n      Do you have the Hibiclens soap to use the night before and the morning of surgery ? Nothing to eat or drink after midnight, only a sip of water to take any medication instructed to take the night before.   Wear comfortable clothing, leave any valuables at home, remove any jewelry and body piercing . y

## 2023-04-25 ENCOUNTER — ANESTHESIA (OUTPATIENT)
Dept: OPERATING ROOM | Age: 73
End: 2023-04-25
Payer: MEDICARE

## 2023-04-25 ENCOUNTER — HOSPITAL ENCOUNTER (OUTPATIENT)
Age: 73
Setting detail: OBSERVATION
Discharge: HOME | End: 2023-04-29
Attending: UROLOGY | Admitting: UROLOGY
Payer: MEDICARE

## 2023-04-25 DIAGNOSIS — N13.8 BENIGN PROSTATIC HYPERPLASIA WITH URINARY OBSTRUCTION: Primary | ICD-10-CM

## 2023-04-25 DIAGNOSIS — N40.1 BENIGN PROSTATIC HYPERPLASIA WITH URINARY OBSTRUCTION: Primary | ICD-10-CM

## 2023-04-25 PROBLEM — R31.0 HEMATURIA, GROSS: Status: ACTIVE | Noted: 2023-04-25

## 2023-04-25 PROCEDURE — 3600000013 HC SURGERY LEVEL 3 ADDTL 15MIN: Performed by: UROLOGY

## 2023-04-25 PROCEDURE — 2720000010 HC SURG SUPPLY STERILE: Performed by: UROLOGY

## 2023-04-25 PROCEDURE — G0378 HOSPITAL OBSERVATION PER HR: HCPCS

## 2023-04-25 PROCEDURE — 3700000000 HC ANESTHESIA ATTENDED CARE: Performed by: UROLOGY

## 2023-04-25 PROCEDURE — 6360000002 HC RX W HCPCS: Performed by: NURSE ANESTHETIST, CERTIFIED REGISTERED

## 2023-04-25 PROCEDURE — 3700000001 HC ADD 15 MINUTES (ANESTHESIA): Performed by: UROLOGY

## 2023-04-25 PROCEDURE — 3600000003 HC SURGERY LEVEL 3 BASE: Performed by: UROLOGY

## 2023-04-25 PROCEDURE — 2580000003 HC RX 258: Performed by: ANESTHESIOLOGY

## 2023-04-25 PROCEDURE — 6360000002 HC RX W HCPCS: Performed by: UROLOGY

## 2023-04-25 PROCEDURE — 7100000001 HC PACU RECOVERY - ADDTL 15 MIN: Performed by: UROLOGY

## 2023-04-25 PROCEDURE — 6370000000 HC RX 637 (ALT 250 FOR IP): Performed by: ANESTHESIOLOGY

## 2023-04-25 PROCEDURE — 6370000000 HC RX 637 (ALT 250 FOR IP): Performed by: UROLOGY

## 2023-04-25 PROCEDURE — 2500000003 HC RX 250 WO HCPCS: Performed by: NURSE ANESTHETIST, CERTIFIED REGISTERED

## 2023-04-25 PROCEDURE — 2580000003 HC RX 258: Performed by: UROLOGY

## 2023-04-25 PROCEDURE — 7100000000 HC PACU RECOVERY - FIRST 15 MIN: Performed by: UROLOGY

## 2023-04-25 PROCEDURE — 2709999900 HC NON-CHARGEABLE SUPPLY: Performed by: UROLOGY

## 2023-04-25 RX ORDER — SODIUM CHLORIDE 9 MG/ML
INJECTION, SOLUTION INTRAVENOUS PRN
Status: DISCONTINUED | OUTPATIENT
Start: 2023-04-25 | End: 2023-04-25 | Stop reason: HOSPADM

## 2023-04-25 RX ORDER — SODIUM CHLORIDE 0.9 % (FLUSH) 0.9 %
5-40 SYRINGE (ML) INJECTION PRN
Status: DISCONTINUED | OUTPATIENT
Start: 2023-04-25 | End: 2023-04-25 | Stop reason: HOSPADM

## 2023-04-25 RX ORDER — LIDOCAINE HYDROCHLORIDE 20 MG/ML
INJECTION, SOLUTION INFILTRATION; PERINEURAL PRN
Status: DISCONTINUED | OUTPATIENT
Start: 2023-04-25 | End: 2023-04-25 | Stop reason: SDUPTHER

## 2023-04-25 RX ORDER — ALBUTEROL SULFATE 90 UG/1
2 AEROSOL, METERED RESPIRATORY (INHALATION) EVERY 6 HOURS PRN
Status: DISCONTINUED | OUTPATIENT
Start: 2023-04-25 | End: 2023-04-29 | Stop reason: HOSPADM

## 2023-04-25 RX ORDER — SODIUM CHLORIDE 0.9 % (FLUSH) 0.9 %
5-40 SYRINGE (ML) INJECTION EVERY 12 HOURS SCHEDULED
Status: DISCONTINUED | OUTPATIENT
Start: 2023-04-25 | End: 2023-04-25 | Stop reason: HOSPADM

## 2023-04-25 RX ORDER — MIDAZOLAM HYDROCHLORIDE 1 MG/ML
INJECTION INTRAMUSCULAR; INTRAVENOUS PRN
Status: DISCONTINUED | OUTPATIENT
Start: 2023-04-25 | End: 2023-04-25 | Stop reason: SDUPTHER

## 2023-04-25 RX ORDER — GABAPENTIN 100 MG/1
100 CAPSULE ORAL ONCE
Status: COMPLETED | OUTPATIENT
Start: 2023-04-25 | End: 2023-04-25

## 2023-04-25 RX ORDER — ONDANSETRON 2 MG/ML
4 INJECTION INTRAMUSCULAR; INTRAVENOUS
Status: DISCONTINUED | OUTPATIENT
Start: 2023-04-25 | End: 2023-04-25 | Stop reason: HOSPADM

## 2023-04-25 RX ORDER — DOCUSATE SODIUM 100 MG/1
100 CAPSULE, LIQUID FILLED ORAL 2 TIMES DAILY
Status: DISCONTINUED | OUTPATIENT
Start: 2023-04-25 | End: 2023-04-29 | Stop reason: HOSPADM

## 2023-04-25 RX ORDER — FENTANYL CITRATE 50 UG/ML
INJECTION, SOLUTION INTRAMUSCULAR; INTRAVENOUS PRN
Status: DISCONTINUED | OUTPATIENT
Start: 2023-04-25 | End: 2023-04-25 | Stop reason: SDUPTHER

## 2023-04-25 RX ORDER — HYDROCODONE BITARTRATE AND ACETAMINOPHEN 5; 325 MG/1; MG/1
1 TABLET ORAL EVERY 6 HOURS PRN
Qty: 5 TABLET | Refills: 0 | Status: SHIPPED | OUTPATIENT
Start: 2023-04-25 | End: 2023-04-28

## 2023-04-25 RX ORDER — FENTANYL CITRATE 0.05 MG/ML
50 INJECTION, SOLUTION INTRAMUSCULAR; INTRAVENOUS EVERY 5 MIN PRN
Status: DISCONTINUED | OUTPATIENT
Start: 2023-04-25 | End: 2023-04-25 | Stop reason: HOSPADM

## 2023-04-25 RX ORDER — DEXAMETHASONE SODIUM PHOSPHATE 4 MG/ML
INJECTION, SOLUTION INTRA-ARTICULAR; INTRALESIONAL; INTRAMUSCULAR; INTRAVENOUS; SOFT TISSUE PRN
Status: DISCONTINUED | OUTPATIENT
Start: 2023-04-25 | End: 2023-04-25 | Stop reason: SDUPTHER

## 2023-04-25 RX ORDER — ACETAMINOPHEN 500 MG
1000 TABLET ORAL ONCE
Status: COMPLETED | OUTPATIENT
Start: 2023-04-25 | End: 2023-04-25

## 2023-04-25 RX ORDER — SODIUM CHLORIDE, SODIUM LACTATE, POTASSIUM CHLORIDE, CALCIUM CHLORIDE 600; 310; 30; 20 MG/100ML; MG/100ML; MG/100ML; MG/100ML
INJECTION, SOLUTION INTRAVENOUS CONTINUOUS
Status: DISCONTINUED | OUTPATIENT
Start: 2023-04-25 | End: 2023-04-28

## 2023-04-25 RX ORDER — DIPHENHYDRAMINE HYDROCHLORIDE 50 MG/ML
12.5 INJECTION INTRAMUSCULAR; INTRAVENOUS
Status: DISCONTINUED | OUTPATIENT
Start: 2023-04-25 | End: 2023-04-25 | Stop reason: HOSPADM

## 2023-04-25 RX ORDER — PROPOFOL 10 MG/ML
INJECTION, EMULSION INTRAVENOUS PRN
Status: DISCONTINUED | OUTPATIENT
Start: 2023-04-25 | End: 2023-04-25 | Stop reason: SDUPTHER

## 2023-04-25 RX ORDER — ATORVASTATIN CALCIUM 40 MG/1
40 TABLET, FILM COATED ORAL NIGHTLY
Status: DISCONTINUED | OUTPATIENT
Start: 2023-04-25 | End: 2023-04-29 | Stop reason: HOSPADM

## 2023-04-25 RX ORDER — FENTANYL CITRATE 0.05 MG/ML
25 INJECTION, SOLUTION INTRAMUSCULAR; INTRAVENOUS EVERY 5 MIN PRN
Status: DISCONTINUED | OUTPATIENT
Start: 2023-04-25 | End: 2023-04-25 | Stop reason: HOSPADM

## 2023-04-25 RX ORDER — EPHEDRINE SULFATE/0.9% NACL/PF 50 MG/5 ML
SYRINGE (ML) INTRAVENOUS PRN
Status: DISCONTINUED | OUTPATIENT
Start: 2023-04-25 | End: 2023-04-25 | Stop reason: SDUPTHER

## 2023-04-25 RX ORDER — LOSARTAN POTASSIUM 50 MG/1
100 TABLET ORAL DAILY
Status: DISCONTINUED | OUTPATIENT
Start: 2023-04-26 | End: 2023-04-29 | Stop reason: HOSPADM

## 2023-04-25 RX ORDER — ONDANSETRON 2 MG/ML
INJECTION INTRAMUSCULAR; INTRAVENOUS PRN
Status: DISCONTINUED | OUTPATIENT
Start: 2023-04-25 | End: 2023-04-25 | Stop reason: SDUPTHER

## 2023-04-25 RX ORDER — CEFAZOLIN SODIUM 1 G/3ML
INJECTION, POWDER, FOR SOLUTION INTRAMUSCULAR; INTRAVENOUS PRN
Status: DISCONTINUED | OUTPATIENT
Start: 2023-04-25 | End: 2023-04-25 | Stop reason: SDUPTHER

## 2023-04-25 RX ORDER — CEPHALEXIN 500 MG/1
500 CAPSULE ORAL 3 TIMES DAILY
Qty: 20 CAPSULE | Refills: 0 | Status: SHIPPED | OUTPATIENT
Start: 2023-04-25

## 2023-04-25 RX ORDER — HYDROCHLOROTHIAZIDE 25 MG/1
12.5 TABLET ORAL DAILY
Status: DISCONTINUED | OUTPATIENT
Start: 2023-04-26 | End: 2023-04-29 | Stop reason: HOSPADM

## 2023-04-25 RX ORDER — HYDROCODONE BITARTRATE AND ACETAMINOPHEN 5; 325 MG/1; MG/1
1 TABLET ORAL EVERY 4 HOURS PRN
Status: DISCONTINUED | OUTPATIENT
Start: 2023-04-25 | End: 2023-04-29 | Stop reason: HOSPADM

## 2023-04-25 RX ORDER — SODIUM CHLORIDE, SODIUM LACTATE, POTASSIUM CHLORIDE, CALCIUM CHLORIDE 600; 310; 30; 20 MG/100ML; MG/100ML; MG/100ML; MG/100ML
INJECTION, SOLUTION INTRAVENOUS CONTINUOUS
Status: DISCONTINUED | OUTPATIENT
Start: 2023-04-25 | End: 2023-04-25 | Stop reason: HOSPADM

## 2023-04-25 RX ORDER — LIDOCAINE HYDROCHLORIDE 10 MG/ML
1 INJECTION, SOLUTION EPIDURAL; INFILTRATION; INTRACAUDAL; PERINEURAL
Status: DISCONTINUED | OUTPATIENT
Start: 2023-04-25 | End: 2023-04-25 | Stop reason: HOSPADM

## 2023-04-25 RX ADMIN — SODIUM CHLORIDE, POTASSIUM CHLORIDE, SODIUM LACTATE AND CALCIUM CHLORIDE: 600; 310; 30; 20 INJECTION, SOLUTION INTRAVENOUS at 21:44

## 2023-04-25 RX ADMIN — PROPOFOL 170 MG: 10 INJECTION, EMULSION INTRAVENOUS at 14:03

## 2023-04-25 RX ADMIN — ACETAMINOPHEN 1000 MG: 500 TABLET ORAL at 13:50

## 2023-04-25 RX ADMIN — ONDANSETRON 4 MG: 2 INJECTION INTRAMUSCULAR; INTRAVENOUS at 14:05

## 2023-04-25 RX ADMIN — CEFAZOLIN 2000 MG: 1 INJECTION, POWDER, FOR SOLUTION INTRAMUSCULAR; INTRAVENOUS at 14:12

## 2023-04-25 RX ADMIN — Medication 20 MG: at 14:14

## 2023-04-25 RX ADMIN — LIDOCAINE HYDROCHLORIDE 40 MG: 20 INJECTION, SOLUTION INFILTRATION; PERINEURAL at 14:03

## 2023-04-25 RX ADMIN — GABAPENTIN 100 MG: 100 CAPSULE ORAL at 13:50

## 2023-04-25 RX ADMIN — MIDAZOLAM 2 MG: 1 INJECTION INTRAMUSCULAR; INTRAVENOUS at 14:03

## 2023-04-25 RX ADMIN — ATORVASTATIN CALCIUM 40 MG: 40 TABLET, FILM COATED ORAL at 21:44

## 2023-04-25 RX ADMIN — DEXAMETHASONE SODIUM PHOSPHATE 8 MG: 4 INJECTION, SOLUTION INTRAMUSCULAR; INTRAVENOUS at 14:05

## 2023-04-25 RX ADMIN — SODIUM CHLORIDE, POTASSIUM CHLORIDE, SODIUM LACTATE AND CALCIUM CHLORIDE: 600; 310; 30; 20 INJECTION, SOLUTION INTRAVENOUS at 13:13

## 2023-04-25 RX ADMIN — FENTANYL CITRATE 100 MCG: 50 INJECTION, SOLUTION INTRAMUSCULAR; INTRAVENOUS at 14:03

## 2023-04-25 RX ADMIN — CEFAZOLIN 1000 MG: 1 INJECTION, POWDER, FOR SOLUTION INTRAMUSCULAR; INTRAVENOUS at 22:03

## 2023-04-25 ASSESSMENT — PAIN SCALES - GENERAL
PAINLEVEL_OUTOF10: 0

## 2023-04-25 ASSESSMENT — PAIN - FUNCTIONAL ASSESSMENT: PAIN_FUNCTIONAL_ASSESSMENT: NONE - DENIES PAIN

## 2023-04-25 NOTE — ANESTHESIA POSTPROCEDURE EVALUATION
Department of Anesthesiology  Postprocedure Note    Patient: Boone Delgadillo  MRN: [id number]  YOB: 1950  Date of evaluation: 4/25/2023      Procedure Summary     Date: 04/25/23 Room / Location: [address] [place] [address] / [facility]: AILYN MA    Anesthesia Start: 9985 Anesthesia Stop: 1501    Procedure: CYSTOSCOPY GREENLIGHT VAPORIZATION OF PROSTATE Diagnosis:       BPH with urinary obstruction      (BPH with urinary obstruction [N40.1, N13.8])    Surgeons: Humble Ku MD Responsible Provider: Mary Jimenez MD    Anesthesia Type: general ASA Status: 3          Anesthesia Type: No value filed.     Bubba Phase I: Bubba Score: 10    Bubba Phase II:        Anesthesia Post Evaluation    Comments: POST- ANESTHESIA EVALUATION       Pt Name: Boone Delgadillo  MRN: [id number]  YOB: 1950  Date of evaluation: 4/25/2023  Time:  5:42 PM      BP (!) 141/72   Pulse 68   Temp 97.3 °F (36.3 °C) (Infrared)   Resp 15   Ht 5' 9\" (1.753 m)   Wt 190 lb (86.2 kg)   SpO2 98%   BMI 28.06 kg/m²      Consciousness Level  Awake  Cardiopulmonary Status  Stable  Pain Adequately Treated YES  Nausea / Vomiting  NO  Adequate Hydration  YES  Anesthesia Related Complications NONE      Electronically signed by Mary Jimenez MD on 4/25/2023 at 5:42 PM

## 2023-04-25 NOTE — INTERVAL H&P NOTE
Update History & Physical    The patient's History and Physical of April 11, 2023 was reviewed with the patient and I examined the patient. There was no change. Here today for Luige Unruly [address] per Dr. Yariel Dupont. Pt AAO x 3 in NAD. HR irregular rhythm. Bradycardic. No adventitious lung sounds. No respiratory distress. Pt has indwelling moeller with leg bag in place with clear, yellow urine in bag. NPO p [place]. Took losartan and keflex this am with sip of water. Pt taking oral antibiotics as prescribed. Denies taking anticoagulants or blood thinning medications. Denies recent or current chest pain/pressure, palpitations, SOB, recent URI, fever or chills. Review vitals per RN flowsheet.        Electronically signed by JARRED Martinez CNP on 4/25/2023 at 12:36 PM

## 2023-04-25 NOTE — BRIEF OP NOTE
Brief Postoperative Note      Patient: Allan Cardona  YOB: 1950  MRN: [id number]    Date of Procedure: 4/25/2023    Pre-Op Diagnosis Codes:     * BPH with urinary obstruction [N40.1, N13.8]    Post-Op Diagnosis: Post-Op Diagnosis Codes:     * BPH with urinary obstruction [N40.1, N13.8]       Procedure(s):  CYSTOSCOPY GREENLIGHT VAPORIZATION OF PROSTATE    Surgeon(s):  Mikayla Tellez MD    Assistant:  * No surgical staff found *    Anesthesia: General    Estimated Blood Loss (mL): Minimal    Complications: None    Specimens:   * No specimens in log *    Implants:  * No implants in log *      Drains: * No LDAs found *    Findings: d      Electronically signed by Mikayla Tellez MD on 4/25/2023 at 1:06 PM

## 2023-04-25 NOTE — ANESTHESIA PRE PROCEDURE
Department of Anesthesiology  Preprocedure Note       Name:  Dior Townsend   Age:  67 y.o.  :  1950                                          MRN:  [id number]         Date:  2023      Surgeon: Endy Campoverde):  Manisha Frausto MD    Procedure: Procedure(s):  CYSTOSCOPY GREENLIGHT VAPORIZATION OF PROSTATE    Medications prior to admission:   Prior to Admission medications    Medication Sig Start Date End Date Taking?  Authorizing Provider   hydroCHLOROthiazide (HYDRODIURIL) 12.5 MG tablet  23   Historical Provider, MD   albuterol sulfate HFA (PROVENTIL;VENTOLIN;PROAIR) 108 (90 Base) MCG/ACT inhaler  3/9/23   Historical Provider, MD   cephALEXin (KEFLEX) 500 MG capsule Take 1 capsule by mouth 3 times daily for 10 days 23  JARRED Castellano - CNP   atorvastatin (LIPITOR) 40 MG tablet TAKE 1 TABLET BY MOUTH EVERY DAY 3/6/23   Guillermo Wolff MD   tamsulosin (FLOMAX) 0.4 MG capsule TAKE 1 CAPSULE BY MOUTH EVERY DAY  Patient not taking: Reported on 2023 1/3/23   Manisha Frausto MD   Multiple Vitamins-Minerals (THERAPEUTIC MULTIVITAMIN-MINERALS) tablet Take 1 tablet by mouth daily    Historical Provider, MD   ALBUTEROL SULFATE HFA IN  22   Historical Provider, MD   losartan (COZAAR) 100 MG tablet Take 1 tablet by mouth daily TAKE 1 TABLET BY MOUTH DAILY (TAKE WITH HYDROCHLOROTHIAZIDE 12.5 MG TABLETS) 22   Guillermo Wolff MD       Current medications:    Current Facility-Administered Medications   Medication Dose Route Frequency Provider Last Rate Last Admin    lidocaine PF 1 % injection 1 mL  1 mL IntraDERmal Once PRN Gertrude Bosworth, MD        acetaminophen (TYLENOL) tablet 1,000 mg  1,000 mg Oral Once Gertrude Bosworth, MD        gabapentin (NEURONTIN) capsule 100 mg  100 mg Oral Once Gertrude Bosworth, MD        lactated ringers IV soln infusion   IntraVENous Continuous Gertrude Bosworth, MD        sodium chloride flush 0.9 % injection 5-40 mL  5-40 mL IntraVENous

## 2023-04-26 LAB
HCT VFR BLD AUTO: 39.4 % (ref 41–53)
HGB BLD-MCNC: 13.4 G/DL (ref 13.5–17.5)

## 2023-04-26 PROCEDURE — 36415 COLL VENOUS BLD VENIPUNCTURE: CPT

## 2023-04-26 PROCEDURE — 6370000000 HC RX 637 (ALT 250 FOR IP): Performed by: UROLOGY

## 2023-04-26 PROCEDURE — 85014 HEMATOCRIT: CPT

## 2023-04-26 PROCEDURE — G0378 HOSPITAL OBSERVATION PER HR: HCPCS

## 2023-04-26 PROCEDURE — 6360000002 HC RX W HCPCS: Performed by: UROLOGY

## 2023-04-26 PROCEDURE — 85018 HEMOGLOBIN: CPT

## 2023-04-26 PROCEDURE — 2580000003 HC RX 258: Performed by: UROLOGY

## 2023-04-26 RX ADMIN — ATORVASTATIN CALCIUM 40 MG: 40 TABLET, FILM COATED ORAL at 20:30

## 2023-04-26 RX ADMIN — HYDROCHLOROTHIAZIDE 12.5 MG: 25 TABLET ORAL at 07:51

## 2023-04-26 RX ADMIN — DOCUSATE SODIUM 100 MG: 100 CAPSULE, LIQUID FILLED ORAL at 07:51

## 2023-04-26 RX ADMIN — HYDROCODONE BITARTRATE AND ACETAMINOPHEN 1 TABLET: 5; 325 TABLET ORAL at 23:32

## 2023-04-26 RX ADMIN — LOSARTAN POTASSIUM 100 MG: 50 TABLET, FILM COATED ORAL at 07:51

## 2023-04-26 RX ADMIN — DOCUSATE SODIUM 100 MG: 100 CAPSULE, LIQUID FILLED ORAL at 20:30

## 2023-04-26 RX ADMIN — CEFAZOLIN 1000 MG: 1 INJECTION, POWDER, FOR SOLUTION INTRAMUSCULAR; INTRAVENOUS at 05:38

## 2023-04-26 ASSESSMENT — PAIN DESCRIPTION - PAIN TYPE: TYPE: ACUTE PAIN

## 2023-04-26 ASSESSMENT — PAIN - FUNCTIONAL ASSESSMENT: PAIN_FUNCTIONAL_ASSESSMENT: PREVENTS OR INTERFERES SOME ACTIVE ACTIVITIES AND ADLS

## 2023-04-26 ASSESSMENT — PAIN SCALES - GENERAL
PAINLEVEL_OUTOF10: 4
PAINLEVEL_OUTOF10: 0

## 2023-04-26 ASSESSMENT — PAIN DESCRIPTION - DESCRIPTORS: DESCRIPTORS: SPASM

## 2023-04-26 ASSESSMENT — PAIN DESCRIPTION - FREQUENCY: FREQUENCY: INTERMITTENT

## 2023-04-26 ASSESSMENT — PAIN DESCRIPTION - ORIENTATION: ORIENTATION: RIGHT;LEFT

## 2023-04-26 ASSESSMENT — PAIN DESCRIPTION - ONSET: ONSET: SUDDEN

## 2023-04-26 ASSESSMENT — PAIN DESCRIPTION - LOCATION: LOCATION: LEG

## 2023-04-26 NOTE — PROGRESS NOTES
Patient overwhelmed and worried; provided medical update and talked about the medical complications of his surgical procedure; listening presence and support; welcomed prayer     04/26/23 1903   Encounter Summary   Encounter Overview/Reason  Spiritual/Emotional Needs   Service Provided For: Patient   Referral/Consult From: Marce   Last Encounter  04/26/23   Complexity of Encounter Moderate   Spiritual/Emotional needs   Type Spiritual Support   Grief, Loss, and Adjustments   Type Adjustment to illness   Assessment/Intervention/Outcome   Assessment Anxious; Coping; Impaired resilience; Powerlessness   Intervention Active listening;Discussed illness injury and its impact; Explored/Affirmed feelings, thoughts, concerns;Prayer (assurance of)/[place];Sustaining Presence/Ministry of presence   Outcome Coping;Engaged in conversation;Expressed feelings, needs, and concerns;Expressed Gratitude;Receptive

## 2023-04-26 NOTE — PROGRESS NOTES
04/26/23 1943   Encounter Summary   Encounter Overview/Reason  Volunteer Encounter   Service Provided For: Patient   Referral/Consult From: Rounding   Last Encounter  04/26/23  (V)   Complexity of Encounter Low   Spiritual/Emotional needs   Type Spiritual Support   Rituals, Rites and [address]

## 2023-04-26 NOTE — OP NOTE
Operative Note      Patient: Charo Grove  YOB: 1950  MRN: [id number]    Date of Procedure: 4/25/2023    Pre-Op Diagnosis Codes:     * BPH with urinary obstruction [N40.1, N13.8]  Urine retention    Post-Op Diagnosis: Same       Procedure(s):  CYSTOSCOPY GREENLIGHT VAPORIZATION OF PROSTATE    Surgeon(s):  Jai Cannon MD    Assistant:   * No surgical staff found *    Anesthesia: General    Estimated Blood Loss (mL): Minimal    Complications: None    Specimens:   * No specimens in log *    Implants:  * No implants in log *      Drains:   Urinary Catheter 04/25/23 3 Way (Active)   $ Urethral catheter insertion Inserted for procedure 04/25/23 1442   Catheter Indications Perioperative use for selected surgical procedures 04/26/23 0907   Site Assessment Other (Comment) 04/26/23 0447   Urine Color Cherry;Pink 04/26/23 0907   Urine Appearance Clots 04/26/23 0907   Collection Container Standard 04/26/23 0907   Securement Method Leg strap 04/26/23 0907   Catheter Care  Soap and water 04/26/23 0447   Catheter Best Practices  Drainage tube clipped to bed; Bag below bladder;Bag not on floor;Drainage bag less than half full 04/26/23 0907   Status Continuous bladder irrigation;Draining 04/26/23 8606   Manual Irrigation Volume Input (mL) 60 mL 04/26/23 0447       Findings: below      Detailed Description of Procedure:   DATE:  04/25/23    SURGEON:  Surgeon(s):  Jai Cannon MD          PREOPERATIVE DIAGNOSIS:  Bph, bladder outlet obstruction     POSTOPERATIVE DIAGNOSIS:  same     PROCEDURES:  1. Cystoscopy. 2. Transurethral GreenLight photovaporization of the prostate. 3. Rausch catheter placement     ANESTHESIA:  General.     COMPLICATIONS:  None. DRAINS:  A 22 [address] 3-way Rausch catheter. SPECIMEN:  None.     ESTIMATED BLOOD LOSS:  20mL    POWER SETTINGS:  80- 180W         INDICATIONS:  The patient is a 67 y.o. male with a history of benign prostatic hyperplasia resulting in bladder outlet

## 2023-04-26 NOTE — CARE COORDINATION
Case Management Assessment  Initial Evaluation    Date/Time of Evaluation: 4/26/2023 10:03 AM  Assessment Completed by: Cherrie Rockwell RN    If patient is discharged prior to next notation, then this note serves as note for discharge by case management. Patient Name: Boone Delgadillo                   YOB: 1950  Diagnosis: BPH with urinary obstruction [N40.1, N13.8]  Hematuria, gross [R31.0]                   Date / Time: 4/25/2023 12:20 PM    Patient Admission Status: Observation   Readmission Risk (Low < 19, Mod (19-27), High > 27): No data recorded  Current PCP: Gogo Torres MD  PCP verified by CM? Yes    Chart Reviewed: Yes      History Provided by: Patient  Patient Orientation: Alert and Oriented    Patient Cognition: Alert    Hospitalization in the last 30 days (Readmission):  No    If yes, Readmission Assessment in CM Navigator will be completed. Advance Directives:      Code Status: Not on file   Patient's Primary Decision Maker is: Legal Next of Kin      Discharge Planning:    Patient lives with: Spouse/Significant Other Type of Home: House  Primary Care Giver:    Patient Support Systems include: Spouse/Significant Other, Children   Current Financial resources: Medicare  Current community resources: None  Current services prior to admission: None            Current DME:              Type of Home Care services:  None    ADLS  Prior functional level: Independent in ADLs/IADLs  Current functional level: Independent in ADLs/IADLs    PT AM-PAC:   /24  OT AM-PAC:   /24    Family can provide assistance at DC: Yes  Would you like Case Management to discuss the discharge plan with any other family members/significant others, and if so, who?  No  Plans to Return to Present Housing: Yes  Other Identified Issues/Barriers to RETURNING to current housing: none  Potential Assistance needed at discharge: N/A            Potential DME:    Patient expects to discharge to: [address] [place] [address] for

## 2023-04-26 NOTE — PROGRESS NOTES
Department of Urology  Urology Progress Note    Patient:  Idris Lindsay  MRN: [id number]  YOB: 1950    Subjective:  POD #1, GLL. Hematuria continues. Nursing hand irrigating every 2 hours. No pain, no fevers, VSS. Hgb dropped from 15.3---> 13.4, asymptomatic.    No anticoags        Patient Vitals for the past 24 hrs:   BP Temp Temp src Pulse Resp SpO2 Height Weight   04/26/23 0802 (!) 132/46 97.6 °F (36.4 °C) -- 69 19 97 % -- --   04/26/23 0345 114/60 98.1 °F (36.7 °C) Oral 77 20 -- -- --   04/26/23 0000 122/80 98 °F (36.7 °C) Oral 75 18 -- -- --   04/25/23 1945 (!) 133/57 97.9 °F (36.6 °C) Oral 80 20 98 % -- --   04/25/23 1904 133/77 97.3 °F (36.3 °C) -- 71 20 93 % -- --   04/25/23 1900 133/77 -- -- 77 19 90 % -- --   04/25/23 1830 (!) 127/56 -- -- 70 15 93 % -- --   04/25/23 1803 (!) 155/86 -- -- 72 24 92 % -- --   04/25/23 1730 (!) 149/87 -- -- 72 18 94 % -- --   04/25/23 1710 (!) 141/72 -- -- 68 15 98 % -- --   04/25/23 1700 (!) 149/84 -- -- 77 11 95 % -- --   04/25/23 1650 136/77 -- -- 76 15 96 % -- --   04/25/23 1645 136/77 97.3 °F (36.3 °C) Infrared 73 15 98 % -- --   04/25/23 1640 (!) 142/73 -- -- 72 16 96 % -- --   04/25/23 1630 134/82 -- -- 71 14 92 % -- --   04/25/23 1620 (!) 139/52 -- -- 74 14 93 % -- --   04/25/23 1610 (!) 145/79 -- -- 66 13 92 % -- --   04/25/23 1600 (!) 160/81 -- -- 73 14 94 % -- --   04/25/23 1550 (!) 161/69 -- -- 72 13 93 % -- --   04/25/23 1540 (!) 144/80 -- -- 71 13 92 % -- --   04/25/23 1530 (!) 151/79 -- -- 73 14 92 % -- --   04/25/23 1520 (!) 156/90 -- -- 72 13 94 % -- --   04/25/23 1510 (!) 172/81 -- -- 72 14 95 % -- --   04/25/23 1500 (!) 170/85 -- -- 71 14 94 % -- --   04/25/23 1454 (!) 156/90 97.1 °F (36.2 °C) Infrared 70 12 96 % -- --   04/25/23 1245 121/61 97.5 °F (36.4 °C) Infrared 79 16 96 % 5' 9\" (1.753 m) 190 lb (86.2 kg)       Intake/Output Summary (Last 24 hours) at 4/26/2023 1116  Last data filed at 4/26/2023 0508  Gross per 24 hour

## 2023-04-26 NOTE — ACP (ADVANCE CARE PLANNING)
Advance Care Planning     Advance Care Planning Activator (Inpatient)  Conversation Note      Date of ACP Conversation: 4/26/2023     Conversation Conducted with: Patient with Decision Making Capacity    ACP Activator: Sydney Jensen RN        Health Care Decision Maker:     Current Designated Health Care Decision Maker:     Primary Decision Maker: Daysi James - Monica - [phone]  Click here to complete Healthcare Decision Makers including section of the Healthcare Decision Maker Relationship (ie \"Primary\")  Today we documented Decision Maker(s) consistent with Legal Next of Kin hierarchy. Care Preferences    Ventilation: \"If you were in your present state of health and suddenly became very ill and were unable to breathe on your own, what would your preference be about the use of a ventilator (breathing machine) if it were available to you? \"      Would the patient desire the use of ventilator (breathing machine)?: yes    \"If your health worsens and it becomes clear that your chance of recovery is unlikely, what would your preference be about the use of a ventilator (breathing machine) if it were available to you? \"     Would the patient desire the use of ventilator (breathing machine)?: Yes      Resuscitation  \"CPR works best to restart the heart when there is a sudden event, like a heart attack, in someone who is otherwise healthy. Unfortunately, CPR does not typically restart the heart for people who have serious health conditions or who are very sick. \"    \"In the event your heart stopped as a result of an underlying serious health condition, would you want attempts to be made to restart your heart (answer \"yes\" for attempt to resuscitate) or would you prefer a natural death (answer \"no\" for do not attempt to resuscitate)? \" yes       [x] Yes   [] No   Educated Patient / Mammie Louder regarding differences between Advance Directives and portable DNR orders.     Length of ACP Conversation in minutes:

## 2023-04-26 NOTE — PLAN OF CARE
Problem: Discharge Planning  Goal: Discharge to home or other facility with appropriate resources  Outcome: Progressing     Problem: Safety - Adult  Goal: Free from fall injury  Outcome: Progressing     Problem: ABCDS Injury Assessment  Goal: Absence of physical injury  Outcome: Progressing     Problem: Genitourinary - Adult  Goal: Urinary catheter remains patent  Outcome: Progressing     Problem: Infection - Adult  Goal: Absence of infection at discharge  Outcome: Progressing  Goal: Absence of infection during hospitalization  Outcome: Progressing  Goal: Absence of fever/infection during anticipated neutropenic period  Outcome: Progressing

## 2023-04-26 NOTE — PLAN OF CARE
Problem: Discharge Planning  Goal: Discharge to home or other facility with appropriate resources  4/26/2023 1414 by Carlyn Wilkerson RN  Outcome: Progressing     Problem: Safety - Adult  Goal: Free from fall injury  4/26/2023 1414 by Carlyn Wilkerson RN  Outcome: Progressing     Problem: ABCDS Injury Assessment  Goal: Absence of physical injury  4/26/2023 1414 by Carlyn Wilkerson RN  Outcome: Progressing     Problem: Genitourinary - Adult  Goal: Urinary catheter remains patent  4/26/2023 1414 by Carlyn Wilkerson RN  Outcome: Progressing  Flowsheets (Taken 4/26/2023 0907)  Urinary catheter remains patent: Irrigate catheter per Licensed Independent Practitioner order if indicated and notify Licensed Independent Practitioner if unable to irrigate     Problem: Infection - Adult  Goal: Absence of infection at discharge  4/26/2023 1414 by Carlyn Wilkerson RN  Outcome: Progressing     Problem: Infection - Adult  Goal: Absence of infection during hospitalization  4/26/2023 1414 by Carlyn Wilkerson RN  Outcome: Progressing     Problem: Infection - Adult  Goal: Absence of fever/infection during anticipated neutropenic period  4/26/2023 1414 by Carlyn Wilkerson RN  Outcome: Progressing

## 2023-04-26 NOTE — PLAN OF CARE
Problem: Discharge Planning  Goal: Discharge to home or other facility with appropriate resources  4/26/2023 1831 by Renetta Watson RN  Outcome: Completed  4/26/2023 1414 by José Crane RN  Outcome: Progressing     Problem: Safety - Adult  Goal: Free from fall injury  4/26/2023 1831 by Renetta Watson RN  Outcome: Completed  4/26/2023 1414 by José Crane RN  Outcome: Progressing     Problem: ABCDS Injury Assessment  Goal: Absence of physical injury  4/26/2023 1831 by Renetta Watson RN  Outcome: Completed  4/26/2023 1414 by José Crane RN  Outcome: Progressing     Problem: Genitourinary - Adult  Goal: Urinary catheter remains patent  4/26/2023 1831 by Renetta Watson RN  Outcome: Completed  4/26/2023 1414 by José Crane RN  Outcome: Progressing  Flowsheets (Taken 4/26/2023 0907)  Urinary catheter remains patent: Irrigate catheter per Licensed Independent Practitioner order if indicated and notify Licensed Independent Practitioner if unable to irrigate     Problem: Infection - Adult  Goal: Absence of infection at discharge  4/26/2023 1831 by Renetta Watson RN  Outcome: Completed  4/26/2023 1414 by José Crane RN  Outcome: Progressing  Goal: Absence of infection during hospitalization  4/26/2023 1831 by Renetta Watson RN  Outcome: Completed  4/26/2023 1414 by José Crane RN  Outcome: Progressing  Goal: Absence of fever/infection during anticipated neutropenic period  4/26/2023 1831 by Renetta Watson RN  Outcome: Completed  4/26/2023 1414 by José Crane RN  Outcome: Progressing     Problem: ABCDS Injury Assessment  Goal: Absence of physical injury  4/26/2023 1831 by Renetta Watson RN  Outcome: Completed  4/26/2023 1414 by José Crane RN  Outcome: Progressing     Problem: Genitourinary - Adult  Goal: Urinary catheter remains patent  4/26/2023 1831 by Renetta Watson RN  Outcome: Completed  4/26/2023 1414 by José Crane RN  Outcome: Progressing  Flowsheets (Taken 4/26/2023 8076)  Urinary catheter remains patent: Irrigate catheter

## 2023-04-27 PROCEDURE — 2580000003 HC RX 258: Performed by: UROLOGY

## 2023-04-27 PROCEDURE — 6370000000 HC RX 637 (ALT 250 FOR IP): Performed by: UROLOGY

## 2023-04-27 PROCEDURE — G0378 HOSPITAL OBSERVATION PER HR: HCPCS

## 2023-04-27 RX ADMIN — LOSARTAN POTASSIUM 100 MG: 50 TABLET, FILM COATED ORAL at 09:18

## 2023-04-27 RX ADMIN — DOCUSATE SODIUM 100 MG: 100 CAPSULE, LIQUID FILLED ORAL at 09:18

## 2023-04-27 RX ADMIN — ATORVASTATIN CALCIUM 40 MG: 40 TABLET, FILM COATED ORAL at 20:18

## 2023-04-27 RX ADMIN — SODIUM CHLORIDE, POTASSIUM CHLORIDE, SODIUM LACTATE AND CALCIUM CHLORIDE: 600; 310; 30; 20 INJECTION, SOLUTION INTRAVENOUS at 02:12

## 2023-04-27 RX ADMIN — HYDROCHLOROTHIAZIDE 12.5 MG: 25 TABLET ORAL at 09:18

## 2023-04-27 RX ADMIN — HYDROCODONE BITARTRATE AND ACETAMINOPHEN 1 TABLET: 5; 325 TABLET ORAL at 20:18

## 2023-04-27 ASSESSMENT — PAIN SCALES - GENERAL
PAINLEVEL_OUTOF10: 0
PAINLEVEL_OUTOF10: 0
PAINLEVEL_OUTOF10: 4
PAINLEVEL_OUTOF10: 0
PAINLEVEL_OUTOF10: 2
PAINLEVEL_OUTOF10: 0
PAINLEVEL_OUTOF10: 0

## 2023-04-27 ASSESSMENT — PAIN DESCRIPTION - LOCATION: LOCATION: GENERALIZED

## 2023-04-27 NOTE — CARE COORDINATION
ONGOING DISCHARGE PLAN:    Patient is alert and oriented x4. Spoke with patient regarding discharge plan and patient confirms that plan is still home without needs. States he is able to care for moeller at home per self. POD#2 CYSTOSCOPY GREENLIGHT VAPORIZATION OF PROSTATE. Will continue to follow for additional discharge needs. If patient is discharged prior to next notation, then this note serves as note for discharge by case management.     Electronically signed by Nery Townsend RN on 4/27/2023 at 2:00 PM

## 2023-04-27 NOTE — PROGRESS NOTES
04/27/23 1449   Encounter Summary   Encounter Overview/Reason   Encounter   Service Provided For: Patient   Referral/Consult From: Marce   Last Encounter  04/27/23   Begin Time 0115   End Time  0130   Total Time Calculated 15 min   Spiritual/Emotional needs   Type Spiritual Support   Rituals, Rites and Sacraments   Type Anointing  (Shin Winslow 4/27/23)

## 2023-04-27 NOTE — PROGRESS NOTES
04/27/23 1505   Encounter Summary   Encounter Overview/Reason  Volunteer Encounter   Service Provided For: Patient   Referral/Consult From: Rounding   Last Encounter  04/27/23  (V)   Spiritual/Emotional needs   Type Spiritual Support   Rituals, Rites and [address]

## 2023-04-27 NOTE — PLAN OF CARE
Problem: Pain  Goal: Verbalizes/displays adequate comfort level or baseline comfort level  Outcome: Progressing  Note: Pain is being managed with rest, repositioning and medication

## 2023-04-27 NOTE — PLAN OF CARE
Problem: Pain  Goal: Verbalizes/displays adequate comfort level or baseline comfort level  Note: Assess the location, characteristics, onset, duration, frequency, quality, and severity of pain. Encourage immediate report of pain. Use appropriate pain scale to rate pain. Manage pain using nonpharmacologic/pharmacologic interventions.

## 2023-04-28 ENCOUNTER — APPOINTMENT (OUTPATIENT)
Dept: CT IMAGING | Age: 73
End: 2023-04-28
Attending: UROLOGY
Payer: MEDICARE

## 2023-04-28 PROCEDURE — G0378 HOSPITAL OBSERVATION PER HR: HCPCS

## 2023-04-28 PROCEDURE — 72192 CT PELVIS W/O DYE: CPT

## 2023-04-28 PROCEDURE — 51798 US URINE CAPACITY MEASURE: CPT

## 2023-04-28 PROCEDURE — 6370000000 HC RX 637 (ALT 250 FOR IP): Performed by: UROLOGY

## 2023-04-28 RX ADMIN — DOCUSATE SODIUM 100 MG: 100 CAPSULE, LIQUID FILLED ORAL at 09:06

## 2023-04-28 RX ADMIN — LOSARTAN POTASSIUM 100 MG: 50 TABLET, FILM COATED ORAL at 09:06

## 2023-04-28 RX ADMIN — HYDROCHLOROTHIAZIDE 12.5 MG: 25 TABLET ORAL at 09:06

## 2023-04-28 RX ADMIN — ATORVASTATIN CALCIUM 40 MG: 40 TABLET, FILM COATED ORAL at 22:06

## 2023-04-28 ASSESSMENT — PAIN SCALES - GENERAL: PAINLEVEL_OUTOF10: 2

## 2023-04-28 ASSESSMENT — PAIN DESCRIPTION - FREQUENCY: FREQUENCY: INTERMITTENT

## 2023-04-28 ASSESSMENT — PAIN DESCRIPTION - PAIN TYPE: TYPE: ACUTE PAIN

## 2023-04-28 NOTE — PROGRESS NOTES
Writer freddy moeller, ends continuous CBI at this time per Dr. Clary Gillespie. Void trial and Post-void residual to be completed before discharge.

## 2023-04-28 NOTE — PROGRESS NOTES
04/28/23 1253   Encounter Summary   Encounter Overview/Reason  Volunteer Encounter   Service Provided For: Patient   Referral/Consult From: Rounding   Last Encounter  04/28/23  (V)   Spiritual/Emotional needs   Type Spiritual Support   Rituals, Rites and [address]

## 2023-04-28 NOTE — PROGRESS NOTES
Writer notifies Dr. Preethi Carpio of patient status post moeller removal/CBI discontinuation, and loss of IV access. Patient is voiding frequently with no reports of pain or distress. Dr. Preethi Carpio states that we will continue to monitor as long as he is comfortable.

## 2023-04-28 NOTE — PROGRESS NOTES
CBI currently on slow drip. Pt tolerating bladder irrigations. Urine is pink to light pink in color. Currently only (2) small blood clots noted after irrigation and/or when emptying indwelling moellre catheter bag.       Roxie Gastelum RN

## 2023-04-28 NOTE — CARE COORDINATION
ONGOING DISCHARGE PLAN:     Patient is alert and oriented x4. Spoke with patient regarding discharge plan and patient confirms that plan is still home without needs. States he is able to care for moeller at home per self. CBI off, per pt will be taking moeller out today and try void trial.      POD#3 CYSTOSCOPY GREENLIGHT VAPORIZATION OF PROSTATE. Post op appt 5/8/23 @10:30 Dr David Thomas     Will continue to follow for additional discharge needs. If patient is discharged prior to next notation, then this note serves as note for discharge by case management.     Electronically signed by Ellison Spatz, RN on 4/28/2023 at 9:45 AM

## 2023-04-28 NOTE — PLAN OF CARE
Problem: Pain  Goal: Verbalizes/displays adequate comfort level or baseline comfort level  Outcome: Progressing  Note: Assess the location, characteristics, onset, duration, frequency, quality, and severity of pain. Encourage immediate report of pain. Use appropriate pain scale to rate pain. Manage pain using nonpharmacologic/pharmacologic interventions.

## 2023-04-28 NOTE — PROGRESS NOTES
Urology Progress Note    Subjective: doing well. Urine clearing. Patient Vitals for the past 24 hrs:   BP Temp Pulse Resp SpO2   04/28/23 0508 119/74 98.1 °F (36.7 °C) 74 16 95 %   04/28/23 0118 112/69 98.1 °F (36.7 °C) 70 16 95 %   04/27/23 2103 (!) 141/54 98.1 °F (36.7 °C) 70 16 95 %   04/27/23 1709 (!) 130/55 98.4 °F (36.9 °C) 78 16 94 %       Intake/Output Summary (Last 24 hours) at 4/28/2023 0824  Last data filed at 4/27/2023 1925  Gross per 24 hour   Intake 120 ml   Output --   Net 120 ml       Recent Labs     04/26/23  0636   HGB 13.4*   HCT 39.4*     No results for input(s): NA, K, CL, CO2, PHOS, BUN, CREATININE, CA in the last 72 hours. No results for input(s): COLORU, PHUR, LABCAST, WBCUA, RBCUA, MUCUS, TRICHOMONAS, YEAST, BACTERIA, CLARITYU, SPECGRAV, LEUKOCYTESUR, UROBILINOGEN, Swathi Curia in the last 72 hours. Invalid input(s): NITRATE, GLUCOSEUKETONESUAMORPHOUS    Additional Lab/culture results:     Physical Exam: NAD. Rausch in place, urine clear. Interval Imaging Findings: none    Impression:    Patient Active Problem List   Diagnosis    Mixed hyperlipidemia    BMI 27.0-27.9,adult    Hyperglycemia    Vitiligo    Benign prostatic hyperplasia with urinary obstruction    Mild chronic obstructive pulmonary disease (HCC)    Simple chronic bronchitis (HCC)    Preop examination    Hematuria, gross       Plan: urine clear with CBI on very slow drip. CBI clamped. Void trial prior to D/C.      Abbe Mon MD  8:24 AM 4/28/2023

## 2023-04-29 VITALS
DIASTOLIC BLOOD PRESSURE: 83 MMHG | RESPIRATION RATE: 18 BRPM | HEIGHT: 69 IN | TEMPERATURE: 97.7 F | OXYGEN SATURATION: 93 % | WEIGHT: 190 LBS | HEART RATE: 73 BPM | BODY MASS INDEX: 28.14 KG/M2 | SYSTOLIC BLOOD PRESSURE: 124 MMHG

## 2023-04-29 PROCEDURE — 6370000000 HC RX 637 (ALT 250 FOR IP): Performed by: UROLOGY

## 2023-04-29 PROCEDURE — G0378 HOSPITAL OBSERVATION PER HR: HCPCS

## 2023-04-29 RX ADMIN — LOSARTAN POTASSIUM 100 MG: 50 TABLET, FILM COATED ORAL at 09:08

## 2023-04-29 RX ADMIN — HYDROCHLOROTHIAZIDE 12.5 MG: 25 TABLET ORAL at 09:07

## 2023-04-29 NOTE — PLAN OF CARE
Problem: Pain  Goal: Verbalizes/displays adequate comfort level or baseline comfort level  4/29/2023 0343 by Florin Siu RN  Outcome: Progressing  4/28/2023 1904 by Lexi Man RN  Outcome: Progressing  Note: Assess the location, characteristics, onset, duration, frequency, quality, and severity of pain. Encourage immediate report of pain. Use appropriate pain scale to rate pain. Manage pain using nonpharmacologic/pharmacologic interventions.

## 2023-04-29 NOTE — PROGRESS NOTES
Discharge teaching and instructions completed with patient using teachback method. AVS reviewed. Patient voiced understanding regarding prescriptions, follow up appointments, and care of self at home. Discharged home with belonging's. All questions answered.

## 2023-04-29 NOTE — PLAN OF CARE
Problem: Pain  Goal: Verbalizes/displays adequate comfort level or baseline comfort level  4/29/2023 1110 by Evaristo Askew RN  Outcome: Progressing  Patient denies any pain at this time.

## 2023-04-29 NOTE — DISCHARGE INSTR - DIET
Good nutrition is important when healing from an illness, injury, or surgery. Follow any nutrition recommendations given to you during your hospital stay. If you were given an oral nutrition supplement while in the hospital, continue to take this supplement at home. You can take it with meals, in-between meals, and/or before bedtime. These supplements can be purchased at most local grocery stores, pharmacies, and chain [employer]-stores. If you have any questions about your diet or nutrition, call the hospital and ask for the dietitian. Regular diet.

## 2023-04-29 NOTE — CARE COORDINATION
ONGOING DISCHARGE PLAN:     Patient is alert and oriented x4. Spoke with patient regarding discharge plan and patient confirms that plan is still home without needs. States he is able to care for moeller at home per self. CBI off, per pt will be taking moeller out today and try void trial. CY pelvis ordered overnight for bladder scan of 659ml. POD#4 CYSTOSCOPY GREENLIGHT VAPORIZATION OF PROSTATE. Post op appt 5/8/23 @10:30 Dr Susan Chou     Will continue to follow for additional discharge needs. If patient is discharged prior to next notation, then this note serves as note for discharge by case management.     Electronically signed by Kristen Ceballos RN on 4/29/2023 at 9:03 AM

## 2023-05-01 ENCOUNTER — CARE COORDINATION (OUTPATIENT)
Dept: CASE MANAGEMENT | Age: 73
End: 2023-05-01

## 2023-05-01 NOTE — DISCHARGE SUMMARY
[address]                 [address] [facility], [address]                               DISCHARGE SUMMARY    PATIENT NAME: Tali Medina               :        1950  MED REC NO:   [id number]                              ROOM:       2056  ACCOUNT NO:   [de-identified]                           ADMIT DATE: 2023  PROVIDER:     Afshan Henriquez                          [facility] DATE: 2023    NARRATIVE:  This is a 76-year-old white male, who has a history of BPH  and bladder outlet obstruction who had a GreenLight laser ablation of  the prostate on 2023. Surgery went well. He had no  complications. Postoperatively, he was admitted for hematuria. His  hematuria slowly improved over the course of the admission. He had no  hospital complications. He had no consultations. His hematuria slowly  improved and he had a voiding trial a day before the discharge. He  unfortunately failed his voiding trial and a Rausch catheter was placed  and he was ready for discharge on 2023. There were no hospital  complications. There were no consultations. CONDITION UPON DISCHARGE:  Stable. DISPOSITION:  Home. DISCHARGE RECOMMENDATIONS:  ACTIVITY:  No heavy lifting, pushing, or pulling. MEDICATIONS:  Please see med rec. DIET:  Regular. FOLLOWUP:  With me next week to have his cath removed.         Leonora Carcamo    D: 2023 12:55:51       T: 2023 18:01:10     [email or web address]  Job#: [id number]     Doc#: [id number]    CC:  Afshan Henriquez

## 2023-05-01 NOTE — CARE COORDINATION
[facility] Care Transitions Initial Follow Up Call    Call within 2 business days of discharge: Yes      Patient: Kenton Browne Patient : 1950   MRN: [id number]  Reason for Admission: Benign prostatic hyperplasia with urinary obstruction  S/P Greenlight Laser Ablation  Discharge Date: 23 RARS: No data recorded    Last Discharge  [address]       Date Complaint Diagnosis Description Type Department Provider    23  Benign prostatic hyperplasia with urinary obstruction Admission (Discharged) Joya Leiva MD            Was this an external facility discharge? No Discharge Facility: [facility] to be reviewed by the provider   Additional needs identified to be addressed with provider: No  none               Method of communication with provider: none. 1st attempt to reach patient for Care Transitions. No answer and no voice mail. Will attempt at a later date/time.       Non-face-to-face services provided:  Obtained and reviewed discharge summary and/or continuity of care documents      Care Transitions 24 Hour Call    Care Transitions Interventions         Follow Up  Future Appointments   Date Time Provider Martinez Grigsby   2023 10:30 AM JARRED Armendariz - CNP . Gennie Koyanagi [facility]RO [facility]   2023  9:15 AM [facility] CT RM 1 STCZ CT SCAN [facility] Radiolog   10/24/2023  9:30 AM MD BLAINE Glaser, RN

## 2023-05-02 ENCOUNTER — CARE COORDINATION (OUTPATIENT)
Dept: CASE MANAGEMENT | Age: 73
End: 2023-05-02

## 2023-05-02 DIAGNOSIS — R31.0 HEMATURIA, GROSS: Primary | ICD-10-CM

## 2023-05-02 NOTE — CARE COORDINATION
[address] Care Transitions Initial Follow Up Call    Call within 2 business days of discharge: Yes    Patient Current Location:  Home: [place] [address]    Care Transition Nurse contacted the patient by telephone to perform post hospital discharge assessment. Verified name and  with patient as identifiers. Provided introduction to self, and explanation of the Care Transition Nurse role. Patient: Jerel Boast Patient : 1950   MRN: [id number]  Reason for Admission: hematuria, benign prostatic hyperplasia with urinary obstruction  Discharge Date: 23 RARS: No data recorded    [facility]       Date Complaint Diagnosis Description Type Department Provider    23  Benign prostatic hyperplasia with urinary obstruction Admission (Discharged) Zita Jefferson MD            Was this an external facility discharge? No Discharge Facility: [facility]    Challenges to be reviewed by the provider   Additional needs identified to be addressed with provider: No  none               Method of communication with provider: none. Patient was discharged on 23 after cystoscopy greenlight vaporization of prostate. Stated he is doing well since discharge, had urinary catheter removed prior to discharge, no bleeding noted with urination since home. Stated he has some discomfort, burning sensation with urination from catheter, was told by Urology this will improve over next couple of weeks. Pt did receive call from NP at Urology today, has follow up on 23. Pt is wearing Depends for leakage, urinary incontinence at this time, taking Keflex tid for @ 2 more weeks. Taking Norco occasionally at bedtime, not needing it during the day. No needs or concerns. Agreeable to transitions calls. Care Transition Nurse reviewed discharge instructions with patient who verbalized understanding.  The patient was given an opportunity to ask questions and does not have any further

## 2023-05-02 NOTE — CARE COORDINATION
Care Transitions Outreach Attempt #2    Call within 2 business days of discharge: Yes   Attempt #2 to reach patient for transitions of care follow up. Unable to reach patient. Left message requesting call back. Alternate number VMB full. Pt has post op appt on 23 with Uro. CTN sign off if no return call received. Patient: Dinorah Magallon Patient : 1950 MRN: [id number]    Last Discharge  Street       Date Complaint Diagnosis Description Type Department Provider    23  Benign prostatic hyperplasia with urinary obstruction Admission (Discharged) Franck Butts MD              Was this an external facility discharge?  No Discharge Facility: [facility]    Noted following upcoming appointments from discharge chart review:   [facility] follow up appointment(s):   Future Appointments   Date Time Provider Martinez Grigsby   2023 10:30 AM JARRED Coates - CNP [facility]   2023 12:15 PM [address]   2023  9:15 AM [facility] CT RM 1 STCZ CT SCAN [facility] Radiolog   10/24/2023  9:30 AM Sunday Brannon MD [facility] BSN   Care Transitions Nurse  [phone]

## 2023-05-08 ENCOUNTER — HOSPITAL ENCOUNTER (OUTPATIENT)
Age: 73
Discharge: HOME | End: 2023-05-08
Payer: MEDICARE

## 2023-05-08 ENCOUNTER — OFFICE VISIT (OUTPATIENT)
Dept: UROLOGY | Age: 73
End: 2023-05-08
Payer: MEDICARE

## 2023-05-08 VITALS
HEART RATE: 64 BPM | SYSTOLIC BLOOD PRESSURE: 102 MMHG | WEIGHT: 190 LBS | HEIGHT: 69 IN | DIASTOLIC BLOOD PRESSURE: 68 MMHG | RESPIRATION RATE: 18 BRPM | TEMPERATURE: 97.8 F | BODY MASS INDEX: 28.14 KG/M2

## 2023-05-08 DIAGNOSIS — N40.1 BENIGN PROSTATIC HYPERPLASIA WITH INCOMPLETE BLADDER EMPTYING: Primary | ICD-10-CM

## 2023-05-08 DIAGNOSIS — I10 ESSENTIAL HYPERTENSION: ICD-10-CM

## 2023-05-08 DIAGNOSIS — Z92.89 HISTORY OF PHOTOVAPORIZATION OF PROSTATE: ICD-10-CM

## 2023-05-08 DIAGNOSIS — R39.14 BENIGN PROSTATIC HYPERPLASIA WITH INCOMPLETE BLADDER EMPTYING: Primary | ICD-10-CM

## 2023-05-08 LAB
ABSOLUTE EOS #: 0.1 K/UL (ref 0–0.4)
ABSOLUTE LYMPH #: 0.8 K/UL (ref 1–4.8)
ABSOLUTE MONO #: 1.1 K/UL (ref 0.1–1.3)
ALBUMIN SERPL-MCNC: 4 G/DL (ref 3.5–5.2)
ALP SERPL-CCNC: 82 U/L (ref 40–129)
ALT SERPL-CCNC: 23 U/L (ref 5–41)
ANION GAP SERPL CALCULATED.3IONS-SCNC: 13 MMOL/L (ref 9–17)
AST SERPL-CCNC: 24 U/L
BASOPHILS # BLD: 0 % (ref 0–2)
BASOPHILS ABSOLUTE: 0 K/UL (ref 0–0.2)
BILIRUB SERPL-MCNC: 1.1 MG/DL (ref 0.3–1.2)
BUN SERPL-MCNC: 21 MG/DL (ref 8–23)
CALCIUM SERPL-MCNC: 8.9 MG/DL (ref 8.6–10.4)
CHLORIDE SERPL-SCNC: 97 MMOL/L (ref 98–107)
CHOLEST SERPL-MCNC: 119 MG/DL
CHOLESTEROL/HDL RATIO: 3.6
CO2 SERPL-SCNC: 25 MMOL/L (ref 20–31)
CREAT SERPL-MCNC: 0.98 MG/DL (ref 0.7–1.2)
EOSINOPHILS RELATIVE PERCENT: 1 % (ref 0–4)
GFR SERPL CREATININE-BSD FRML MDRD: >60 ML/MIN/1.73M2
GLUCOSE SERPL-MCNC: 98 MG/DL (ref 70–99)
HCT VFR BLD AUTO: 37 % (ref 41–53)
HDLC SERPL-MCNC: 33 MG/DL
HGB BLD-MCNC: 12.6 G/DL (ref 13.5–17.5)
LDLC SERPL CALC-MCNC: 76 MG/DL (ref 0–130)
LYMPHOCYTES # BLD: 9 % (ref 24–44)
MCH RBC QN AUTO: 30.5 PG (ref 26–34)
MCHC RBC AUTO-ENTMCNC: 34.1 G/DL (ref 31–37)
MCV RBC AUTO: 89.3 FL (ref 80–100)
MONOCYTES # BLD: 14 % (ref 1–7)
PDW BLD-RTO: 14.6 % (ref 11.5–14.9)
PLATELET # BLD AUTO: 212 K/UL (ref 150–450)
PMV BLD AUTO: 6.7 FL (ref 6–12)
POST VOID RESIDUAL (PVR): NORMAL ML
POTASSIUM SERPL-SCNC: 4 MMOL/L (ref 3.7–5.3)
PROT SERPL-MCNC: 7 G/DL (ref 6.4–8.3)
RBC # BLD: 4.14 M/UL (ref 4.5–5.9)
SEG NEUTROPHILS: 76 % (ref 36–66)
SEGMENTED NEUTROPHILS ABSOLUTE COUNT: 6.5 K/UL (ref 1.3–9.1)
SODIUM SERPL-SCNC: 135 MMOL/L (ref 135–144)
TRIGL SERPL-MCNC: 49 MG/DL
WBC # BLD AUTO: 8.5 K/UL (ref 3.5–11)

## 2023-05-08 PROCEDURE — 80053 COMPREHEN METABOLIC PANEL: CPT

## 2023-05-08 PROCEDURE — 80061 LIPID PANEL: CPT

## 2023-05-08 PROCEDURE — 36415 COLL VENOUS BLD VENIPUNCTURE: CPT

## 2023-05-08 PROCEDURE — 85025 COMPLETE CBC W/AUTO DIFF WBC: CPT

## 2023-05-08 PROCEDURE — 51798 US URINE CAPACITY MEASURE: CPT | Performed by: NURSE PRACTITIONER

## 2023-05-08 PROCEDURE — 99024 POSTOP FOLLOW-UP VISIT: CPT | Performed by: NURSE PRACTITIONER

## 2023-05-08 RX ORDER — HYDROCODONE BITARTRATE AND ACETAMINOPHEN 5; 325 MG/1; MG/1
TABLET ORAL
COMMUNITY
Start: 2023-05-03

## 2023-05-08 ASSESSMENT — ENCOUNTER SYMPTOMS
SHORTNESS OF BREATH: 0
CONSTIPATION: 0
DIARRHEA: 0
BACK PAIN: 0
ABDOMINAL PAIN: 0
EYE REDNESS: 0
WHEEZING: 0
EYE PAIN: 0
COUGH: 0
NAUSEA: 0
VOMITING: 0

## 2023-05-08 NOTE — PROGRESS NOTES
[address] [place] [address]  Dept: [phone]  Dept Fax: [phone] [facility] Urology Postoperative Note    Patient:  Radha Hairston  YOB: 1950  Date: 5/8/2023    The patient is a 67 y.o. male who presents today for evaluationof the following problems:   Chief Complaint   Patient presents with    [address] check up          HPI  Patient with hx of BPH with urinary retention presenting for post-op follow up. Had cysto, GLL 4/25/23 with Dr. Radha Moss. Was admitted after surgery for hematuria, with CBI. Rausch is now out, hematuria has resolved. Fortunately, he has been able to void which is an improvement. AUA SS is 23- he continues to have weak stream with feelings of incomplete emptying. Also c/o urinary urgency and frequency. Sometimes he feels he is not going to make it to the bathroom. Denies any other  concerns today    Summary of old records: N/A    Urinalysis today:  No results found for this visit on 05/08/23. AUA Symptom Score (5/8/2023):   INCOMPLETE EMPTYING: How often have you had the sensation of not emptying your bladder?: More than Half the time  FREQUENCY: How often do you have to urinate less than every two hours?: More than Half the time  INTERMITTENCY: How often have you found you stopped and started again several times when you urinated?: Less than 1 to 5 times  URGENCY: How often have you found it difficult to postpone urination?: About Half the time  WEAK STREAM: How often have you had a weak urinary stream?: Almost always  STRAINING: How often have you had to strain to start  urination?: Less than Half the time  NOCTURIA: How many times did you typically get up at night to uriniate?: 4 Times  TOTAL I-PSS SCORE[de-identified] 23       PAST MEDICAL, FAMILY AND SOCIAL HISTORY UPDATE:  Past Medical History:   Diagnosis Date    Adrenal nodule ([facility] [address]

## 2023-05-08 NOTE — PROGRESS NOTES
Review of Systems   Constitutional:  Negative for chills, fatigue and fever. Eyes:  Negative for pain, redness and visual disturbance. Respiratory:  Negative for cough, shortness of breath and wheezing. Cardiovascular:  Negative for chest pain and leg swelling. Gastrointestinal:  Negative for abdominal pain, constipation, diarrhea, nausea and vomiting. Genitourinary:  Positive for dysuria, frequency and urgency. Negative for difficulty urinating, flank pain, hematuria, scrotal swelling and testicular pain. Musculoskeletal:  Negative for back pain, joint swelling and myalgias. Skin:  Negative for rash and wound. Neurological:  Negative for dizziness, tremors and numbness. Hematological:  Does not bruise/bleed easily.

## 2023-05-09 ENCOUNTER — CARE COORDINATION (OUTPATIENT)
Dept: CASE MANAGEMENT | Age: 73
End: 2023-05-09

## 2023-05-09 NOTE — CARE COORDINATION
[facility] Care Transitions Follow Up Call      Patient: Shannon Molina  Patient : 1950   MRN: [id number]  Reason for Admission: Benign prostatic hyperplasia with urinary obstruction  S/P Greenlight Laser Ablation  Discharge Date: 23 RARS: No data recorded    Needs to be reviewed by the provider   Additional needs identified to be addressed with provider: No  none             Method of communication with provider: none. Attempt to reach patient for Care Transitions. No answer and voice mail box full. Will attempt to contact at a later date/time.       Follow Up  Future Appointments   Date Time Provider Martinez Grigsby   2023 12:15 PM [facility] [address] [facility] [facility]   2023  9:15 AM JARRED Oneill - CNP . C [facility]   2023  9:15 AM [facility] CT RM 1 STCZ CT SCAN [facility] Radiolog   10/24/2023  9:30 AM Jorge Allen MD [facility] Transitions Subsequent and Final Call    Subsequent and Final Calls  Care Transitions Interventions  Other Interventions:               Michelle Dixon RN

## 2023-05-11 ENCOUNTER — CARE COORDINATION (OUTPATIENT)
Dept: CASE MANAGEMENT | Age: 73
End: 2023-05-11

## 2023-05-11 PROBLEM — Z01.818 PREOP EXAMINATION: Status: RESOLVED | Noted: 2023-04-11 | Resolved: 2023-05-11

## 2023-05-11 NOTE — CARE COORDINATION
[facility] Care Transitions Follow Up Call      Patient: Bert Quiñones  Patient : 1950   MRN: [id number]  Reason for Admission: prostatic hyperplasia with urinary obstruction  S/P [employer] Ablation  Discharge Date: 23 RARS: No data recorded    Needs to be reviewed by the provider   Additional needs identified to be addressed with provider: No  none             Method of communication with provider: none. Final attempt to reach patient for care transitions. No answer and no voice mail. Episode resolved at this time.       Follow Up  Future Appointments   Date Time Provider Martinez Grigsby   2023 12:15 PM [facility] [address] [facility]   2023  9:15 AM Jennifer Newby, APRN - CNP [facility]   2023  9:15 AM [facility] CT RM 1 [facility] CT SCAN [facility] Radiolog   10/24/2023  9:30 AM Mayte Chiang MD [facility]  [facility] Transitions Subsequent and Final Call    Subsequent and Final Calls  Care Transitions Interventions  Other Interventions:             Carlene Draper RN

## 2023-05-17 ENCOUNTER — HOSPITAL ENCOUNTER (OUTPATIENT)
Dept: SLEEP CENTER | Age: 73
Discharge: HOME | End: 2023-05-19
Payer: MEDICARE

## 2023-05-17 DIAGNOSIS — G47.33 OSA (OBSTRUCTIVE SLEEP APNEA): ICD-10-CM

## 2023-05-17 PROCEDURE — G0399 HOME SLEEP TEST/TYPE 3 PORTA: HCPCS

## 2023-06-02 LAB — STATUS: NORMAL

## 2023-07-06 ENCOUNTER — TELEPHONE (OUTPATIENT)
Dept: UROLOGY | Age: 73
End: 2023-07-06

## 2023-07-06 NOTE — TELEPHONE ENCOUNTER
Called patient to notify of time change, he stated that he may have a  UTI as he has some burning. Writer advised to go to the lab to give a sample. Patient stated that he would go today. Writer advised the office would call with the results.

## 2023-07-11 ENCOUNTER — OFFICE VISIT (OUTPATIENT)
Dept: UROLOGY | Age: 73
End: 2023-07-11
Payer: MEDICARE

## 2023-07-11 VITALS
SYSTOLIC BLOOD PRESSURE: 132 MMHG | HEART RATE: 66 BPM | HEIGHT: 69 IN | BODY MASS INDEX: 28.14 KG/M2 | TEMPERATURE: 97.8 F | WEIGHT: 190 LBS | DIASTOLIC BLOOD PRESSURE: 78 MMHG

## 2023-07-11 DIAGNOSIS — R39.14 BENIGN PROSTATIC HYPERPLASIA WITH INCOMPLETE BLADDER EMPTYING: Primary | ICD-10-CM

## 2023-07-11 DIAGNOSIS — Z12.5 PROSTATE CANCER SCREENING: ICD-10-CM

## 2023-07-11 DIAGNOSIS — Z92.89 HISTORY OF PHOTOVAPORIZATION OF PROSTATE: ICD-10-CM

## 2023-07-11 DIAGNOSIS — N40.1 BENIGN PROSTATIC HYPERPLASIA WITH INCOMPLETE BLADDER EMPTYING: Primary | ICD-10-CM

## 2023-07-11 DIAGNOSIS — N39.41 URGE INCONTINENCE: ICD-10-CM

## 2023-07-11 LAB — POST VOID RESIDUAL (PVR): NORMAL

## 2023-07-11 PROCEDURE — 51798 US URINE CAPACITY MEASURE: CPT | Performed by: NURSE PRACTITIONER

## 2023-07-11 PROCEDURE — 99024 POSTOP FOLLOW-UP VISIT: CPT | Performed by: NURSE PRACTITIONER

## 2023-07-11 RX ORDER — OXYBUTYNIN CHLORIDE 10 MG/1
10 TABLET, EXTENDED RELEASE ORAL DAILY
Qty: 90 TABLET | Refills: 1 | Status: SHIPPED | OUTPATIENT
Start: 2023-07-11

## 2023-07-11 ASSESSMENT — ENCOUNTER SYMPTOMS
GASTROINTESTINAL NEGATIVE: 1
CONSTIPATION: 0
EYE REDNESS: 0
VOMITING: 0
EYE PAIN: 0
EYES NEGATIVE: 1
WHEEZING: 0
NAUSEA: 0
ABDOMINAL PAIN: 0
COUGH: 0
BACK PAIN: 0
DIARRHEA: 0
SHORTNESS OF BREATH: 0
RESPIRATORY NEGATIVE: 1

## 2023-07-11 NOTE — PROGRESS NOTES
Review of Systems   Constitutional: Negative. Negative for appetite change, chills and fatigue. Eyes: Negative. Negative for pain, redness and visual disturbance. Respiratory: Negative. Negative for cough, shortness of breath and wheezing. Cardiovascular: Negative. Negative for chest pain and leg swelling. Gastrointestinal: Negative. Negative for abdominal pain, constipation, diarrhea, nausea and vomiting. Genitourinary: Negative. Negative for difficulty urinating, dysuria, flank pain, frequency, hematuria and urgency. Musculoskeletal: Negative. Negative for back pain, joint swelling and myalgias. Skin: Negative. Negative for rash and wound. Neurological: Negative. Negative for dizziness, weakness and numbness. Hematological: Negative. Does not bruise/bleed easily.
and PVR. Return in about 3 months (around 10/11/2023) for PSA, PVR. Prescriptions Ordered:  Orders Placed This Encounter   Medications    oxybutynin (DITROPAN-XL) 10 MG extended release tablet     Sig: Take 1 tablet by mouth daily     Dispense:  90 tablet     Refill:  1     Please use goodrx coupon. Orders Placed:  Orders Placed This Encounter   Procedures    PSA Screening     Standing Status:   Future     Standing Expiration Date:   7/11/2024    NM LICO POST-VOIDING RESIDUAL URINE&/BLADDER CAP           JARRED Choudhary CNP    Reviewed and agree with the ROS entered by the MA.

## 2023-07-22 ENCOUNTER — HOSPITAL ENCOUNTER (OUTPATIENT)
Dept: CT IMAGING | Age: 73
End: 2023-07-22
Payer: MEDICARE

## 2023-07-22 DIAGNOSIS — C34.12 PRIMARY MALIGNANT NEOPLASM OF BRONCHUS OF LEFT UPPER LOBE (HCC): ICD-10-CM

## 2023-07-22 PROCEDURE — 71250 CT THORAX DX C-: CPT

## 2023-08-07 ENCOUNTER — TELEPHONE (OUTPATIENT)
Dept: UROLOGY | Age: 73
End: 2023-08-07

## 2023-08-07 DIAGNOSIS — N39.41 URGE INCONTINENCE: Primary | ICD-10-CM

## 2023-08-07 RX ORDER — OXYBUTYNIN CHLORIDE 15 MG/1
15 TABLET, EXTENDED RELEASE ORAL DAILY
Qty: 30 TABLET | Refills: 3 | Status: SHIPPED | OUTPATIENT
Start: 2023-08-07 | End: 2023-09-06 | Stop reason: SDUPTHER

## 2023-08-07 NOTE — TELEPHONE ENCOUNTER
Check on Oxybutynin dosage, see if it can be changed. Patient not utilizes [facility]. Oxybutynin - 10mg Extended Release    Feels he needs more control.

## 2023-08-07 NOTE — TELEPHONE ENCOUNTER
Dosage increased to 15mg daily.  Please call him to schedule a fu with me in 4-6 weeks for fu urge incontinence with PVR

## 2023-09-06 ENCOUNTER — OFFICE VISIT (OUTPATIENT)
Dept: UROLOGY | Age: 73
End: 2023-09-06
Payer: MEDICARE

## 2023-09-06 VITALS
SYSTOLIC BLOOD PRESSURE: 140 MMHG | DIASTOLIC BLOOD PRESSURE: 88 MMHG | HEIGHT: 69 IN | HEART RATE: 66 BPM | TEMPERATURE: 97 F | WEIGHT: 188 LBS | BODY MASS INDEX: 27.85 KG/M2

## 2023-09-06 DIAGNOSIS — Z92.89 HISTORY OF PHOTOVAPORIZATION OF PROSTATE: ICD-10-CM

## 2023-09-06 DIAGNOSIS — N39.41 URGE INCONTINENCE: ICD-10-CM

## 2023-09-06 DIAGNOSIS — N40.1 BENIGN PROSTATIC HYPERPLASIA WITH INCOMPLETE BLADDER EMPTYING: Primary | ICD-10-CM

## 2023-09-06 DIAGNOSIS — R39.14 BENIGN PROSTATIC HYPERPLASIA WITH INCOMPLETE BLADDER EMPTYING: Primary | ICD-10-CM

## 2023-09-06 DIAGNOSIS — R39.15 URINARY URGENCY: ICD-10-CM

## 2023-09-06 LAB — POST VOID RESIDUAL (PVR): NORMAL

## 2023-09-06 PROCEDURE — 51798 US URINE CAPACITY MEASURE: CPT | Performed by: NURSE PRACTITIONER

## 2023-09-06 PROCEDURE — 1123F ACP DISCUSS/DSCN MKR DOCD: CPT | Performed by: NURSE PRACTITIONER

## 2023-09-06 PROCEDURE — 99213 OFFICE O/P EST LOW 20 MIN: CPT | Performed by: NURSE PRACTITIONER

## 2023-09-06 RX ORDER — OXYBUTYNIN CHLORIDE 15 MG/1
15 TABLET, EXTENDED RELEASE ORAL DAILY
Qty: 90 TABLET | Refills: 3 | Status: SHIPPED | OUTPATIENT
Start: 2023-09-06

## 2023-09-06 ASSESSMENT — ENCOUNTER SYMPTOMS
RESPIRATORY NEGATIVE: 1
SHORTNESS OF BREATH: 0
CONSTIPATION: 0
NAUSEA: 0
WHEEZING: 0
EYES NEGATIVE: 1
ABDOMINAL PAIN: 0
VOMITING: 0
EYE PAIN: 0
GASTROINTESTINAL NEGATIVE: 1
COUGH: 0
BACK PAIN: 0
DIARRHEA: 0
EYE REDNESS: 0

## 2023-09-06 NOTE — PROGRESS NOTES
Review of Systems   Constitutional: Negative. Negative for appetite change, chills and fatigue. Eyes: Negative. Negative for pain, redness and visual disturbance. Respiratory: Negative. Negative for cough, shortness of breath and wheezing. Cardiovascular: Negative. Negative for chest pain and leg swelling. Gastrointestinal: Negative. Negative for abdominal pain, constipation, diarrhea, nausea and vomiting. Genitourinary: Negative. Negative for difficulty urinating, dysuria, flank pain, frequency, hematuria and urgency. Musculoskeletal: Negative. Negative for back pain, joint swelling and myalgias. Skin: Negative. Negative for rash and wound. Neurological: Negative. Negative for dizziness, weakness and numbness. Hematological: Negative. Does not bruise/bleed easily.
Hyperlipidemia     Hypertension     PCP DR Dary Frazier    Pulmonary nodule     PVC's (premature ventricular contractions)     Retention of urine     after lung surgery    Snores     denies apnea    Vitiligo     Wears glasses     READING    Wears partial dentures     LOWER     Past Surgical History:   Procedure Laterality Date    CARDIAC CATHETERIZATION  1990s    no blockage per patient    CATARACT REMOVAL Bilateral     CATARACTS WITH IOL PLACED. CHEST TUBE INSERTION      R/t thoracotomy, since removed    COLONOSCOPY      COLONOSCOPY N/A 06/27/2018    COLONOSCOPY POLYPECTOMY HOT performed by Missy Mccracken MD at [address]  10/06/2022    CT BIOPSY ABDOMEN RETROPERITONEUM    CT BIOPSY ABDOMEN RETROPERITONEUM  11/02/2022    CT BIOPSY ABDOMEN RETROPERITONEUM    CYST REMOVAL      neck POSTERIOR    CYSTOSCOPY      PRE-MALIGNANT / BENIGN SKIN LESION EXCISION      UPPER LEFT POSTERIOR BACK.     PROSTATE BIOPSY  10/06/2020    PROSTATE BIOPSY N/A 10/06/2020    PROSTATE BIOPSY, ULTRASOUND (CONFIRMED W/ US-VERITO) performed by Jimi Mcgill MD at [address] Left 01/13/2023    VATS and left upper lobe wedge resection at [address] 4/25/2023    CYSTOSCOPY GREENLIGHT VAPORIZATION OF PROSTATE performed by Jimi Mcgill MD at [address]     Family History   Problem Relation Age of Onset    Lung Cancer Mother         LUNG    Prostate Cancer Father         WITH METS TO BONE    Other Sister         diverticular disease     Outpatient Medications Marked as Taking for the 9/6/23 encounter (Office Visit) with JARRED Gerard - CNP   Medication Sig Dispense Refill    oxybutynin (DITROPAN XL) 15 MG extended release tablet Take 1 tablet by mouth daily 90 tablet 3    hydroCHLOROthiazide (HYDRODIURIL) 12.5 MG tablet       albuterol sulfate HFA (PROVENTIL;VENTOLIN;PROAIR) 108 (90 Base) MCG/ACT inhaler       atorvastatin (LIPITOR) 40 MG tablet TAKE 1 TABLET BY MOUTH EVERY DAY

## 2023-09-26 ENCOUNTER — HOSPITAL ENCOUNTER (OUTPATIENT)
Dept: SLEEP CENTER | Age: 73
Discharge: HOME | End: 2023-09-28
Payer: MEDICARE

## 2023-09-26 DIAGNOSIS — G47.33 SEVERE OBSTRUCTIVE SLEEP APNEA: ICD-10-CM

## 2023-09-26 PROCEDURE — 95811 POLYSOM 6/>YRS CPAP 4/> PARM: CPT

## 2023-09-27 VITALS
HEIGHT: 69 IN | HEART RATE: 63 BPM | RESPIRATION RATE: 16 BRPM | BODY MASS INDEX: 28.73 KG/M2 | WEIGHT: 194 LBS | OXYGEN SATURATION: 91 %

## 2023-09-27 ASSESSMENT — SLEEP AND FATIGUE QUESTIONNAIRES
HOW LIKELY ARE YOU TO NOD OFF OR FALL ASLEEP WHILE SITTING AND TALKING TO SOMEONE: 0
HOW LIKELY ARE YOU TO NOD OFF OR FALL ASLEEP WHILE SITTING QUIETLY AFTER LUNCH WITHOUT ALCOHOL: 0
HOW LIKELY ARE YOU TO NOD OFF OR FALL ASLEEP WHILE WATCHING TV: 1
ESS TOTAL SCORE: 10
HOW LIKELY ARE YOU TO NOD OFF OR FALL ASLEEP WHILE SITTING AND READING: 2
HOW LIKELY ARE YOU TO NOD OFF OR FALL ASLEEP WHILE LYING DOWN TO REST IN THE AFTERNOON WHEN CIRCUMSTANCES PERMIT: 3
HOW LIKELY ARE YOU TO NOD OFF OR FALL ASLEEP IN A CAR, WHILE STOPPED FOR A FEW MINUTES IN TRAFFIC: 0
HOW LIKELY ARE YOU TO NOD OFF OR FALL ASLEEP WHEN YOU ARE A PASSENGER IN A CAR FOR AN HOUR WITHOUT A BREAK: 2
HOW LIKELY ARE YOU TO NOD OFF OR FALL ASLEEP WHILE SITTING INACTIVE IN A PUBLIC PLACE: 2

## 2023-10-08 LAB — STATUS: NORMAL

## 2023-10-11 ENCOUNTER — HOSPITAL ENCOUNTER (OUTPATIENT)
Age: 73
Discharge: HOME | End: 2023-10-11
Payer: MEDICARE

## 2023-10-11 DIAGNOSIS — Z12.5 PROSTATE CANCER SCREENING: ICD-10-CM

## 2023-10-11 LAB — PSA SERPL-MCNC: 0.6 NG/ML (ref 0–4)

## 2023-10-11 PROCEDURE — 36415 COLL VENOUS BLD VENIPUNCTURE: CPT

## 2023-10-11 PROCEDURE — G0103 PSA SCREENING: HCPCS

## 2023-10-25 ENCOUNTER — TELEPHONE (OUTPATIENT)
Dept: GASTROENTEROLOGY | Age: 73
End: 2023-10-25

## 2023-10-25 NOTE — TELEPHONE ENCOUNTER
1st attempt: Chin Blankenship asking for patient to return phone call to schedule colon procedure with [place]. 2nd attempt: writer sent letter to patients home as a reminder to contact our office for scheduling.

## 2023-10-30 DIAGNOSIS — K63.5 POLYP OF COLON, UNSPECIFIED PART OF COLON, UNSPECIFIED TYPE: Primary | ICD-10-CM

## 2023-10-31 RX ORDER — POLYETHYLENE GLYCOL 3350 17 G/17G
POWDER, FOR SOLUTION ORAL
Qty: 238 G | Refills: 0 | Status: SHIPPED | OUTPATIENT
Start: 2023-10-31

## 2023-10-31 RX ORDER — BISACODYL 5 MG/1
TABLET, DELAYED RELEASE ORAL
Qty: 4 TABLET | Refills: 0 | Status: SHIPPED | OUTPATIENT
Start: 2023-10-31

## 2023-11-17 DIAGNOSIS — N40.1 BENIGN PROSTATIC HYPERPLASIA WITH INCOMPLETE BLADDER EMPTYING: ICD-10-CM

## 2023-11-17 DIAGNOSIS — R39.14 BENIGN PROSTATIC HYPERPLASIA WITH INCOMPLETE BLADDER EMPTYING: ICD-10-CM

## 2023-11-17 DIAGNOSIS — N39.41 URGE INCONTINENCE: ICD-10-CM

## 2023-11-22 RX ORDER — OXYBUTYNIN CHLORIDE 15 MG/1
15 TABLET, EXTENDED RELEASE ORAL DAILY
Qty: 30 TABLET | Refills: 3 | OUTPATIENT
Start: 2023-11-22

## 2024-01-19 ENCOUNTER — HOSPITAL ENCOUNTER (OUTPATIENT)
Dept: PREADMISSION TESTING | Age: 74
Discharge: HOME | End: 2024-01-23

## 2024-01-19 VITALS — HEIGHT: 69 IN | BODY MASS INDEX: 28.14 KG/M2 | WEIGHT: 190 LBS

## 2024-01-19 DIAGNOSIS — R39.14 BENIGN PROSTATIC HYPERPLASIA WITH INCOMPLETE BLADDER EMPTYING: Primary | ICD-10-CM

## 2024-01-19 DIAGNOSIS — N40.1 BENIGN PROSTATIC HYPERPLASIA WITH INCOMPLETE BLADDER EMPTYING: Primary | ICD-10-CM

## 2024-01-19 DIAGNOSIS — N39.41 URGE INCONTINENCE: ICD-10-CM

## 2024-01-19 NOTE — PROGRESS NOTES
Pre-op Instructions For Out-Patient Endoscopy Surgery    Medication Instructions:  Please stop herbs and any supplements now (includes vitamins and minerals).    Please contact your surgeon and prescribing physician for pre-op instructions for any blood thinners.    If you have inhalers/aerosol treatments at home, please use them the morning of your surgery and bring the inhalers with you to the hospital.  To bring albuterol  Please take the following medications the morning of your surgery with a sip of water:    none    Surgery Instructions:  After midnight before surgery:  Do not eat or drink anything, including water, mints, gum, and hard candy.  You may brush your teeth without swallowing.  No smoking, chewing tobacco, or street drugs.    Please shower or bathe before surgery.       Please do not wear any cologne, lotion, powder, jewelry, piercings, perfume, makeup, nail polish, hair accessories, or hair spray on the day of surgery.  Wear loose comfortable clothing.    Leave your valuables at home but bring a payment source for any after-surgery prescriptions you plan to fill at [facility].  Bring a storage case for any glasses/contacts.    An adult who is responsible for you MUST drive you home and should be with you for the first 24 hours after surgery.     The Day of Surgery:  Arrive at [facility] Surgery Entrance at the time directed by your surgeon and check in at the desk.     If you have a living will or healthcare power of , please bring a copy.    You will be taken to the pre-op holding area where you will be prepared for surgery.  A physical assessment will be performed by a nurse practitioner or house officer.  Your IV will be started and you will meet your anesthesiologist.    When you go to surgery, your family will be directed to the surgical waiting room, where the doctor should speak with them after your surgery.    After surgery, you will be taken to the

## 2024-01-22 RX ORDER — OXYBUTYNIN CHLORIDE 15 MG/1
15 TABLET, EXTENDED RELEASE ORAL DAILY
Qty: 90 TABLET | Refills: 3 | Status: SHIPPED | OUTPATIENT
Start: 2024-01-22

## 2024-01-31 ENCOUNTER — HOSPITAL ENCOUNTER (OUTPATIENT)
Dept: CT IMAGING | Age: 74
Discharge: HOME | End: 2024-02-02
Payer: MEDICARE

## 2024-01-31 DIAGNOSIS — C34.12 PRIMARY ADENOCARCINOMA OF UPPER LOBE OF LEFT LUNG (HCC): ICD-10-CM

## 2024-01-31 DIAGNOSIS — Z90.2 ACQUIRED ABSENCE OF LUNG: ICD-10-CM

## 2024-01-31 PROCEDURE — 71250 CT THORAX DX C-: CPT

## 2024-01-31 NOTE — PRE-PROCEDURE INSTRUCTIONS
Have you received your Prep? Any questions with prep instructions?Y  Only Clear Liquid Diet day before. Y  Nothing to eat after midnight day before procedure.Y  Are you taking any blood thinners? If so, you need to Stop.N  Remove any jewelry and body piercings.Y  Do you wear glasses? If so, please bring a case to store them in.  Are you having any Covid symptoms?N  Do you have any new rashes, infections, etc. that we should be aware of?N  Do you have a ride home the day of surgery? It cannot be a cab or medical transportation.Y  Verify surgery time/date and what time to arrive at hospital.0900

## 2024-02-01 ENCOUNTER — ANESTHESIA EVENT (OUTPATIENT)
Dept: ENDOSCOPY | Age: 74
End: 2024-02-01
Payer: MEDICARE

## 2024-02-02 ENCOUNTER — ANESTHESIA (OUTPATIENT)
Dept: ENDOSCOPY | Age: 74
End: 2024-02-02
Payer: MEDICARE

## 2024-02-02 ENCOUNTER — HOSPITAL ENCOUNTER (OUTPATIENT)
Age: 74
Setting detail: OUTPATIENT SURGERY
Discharge: HOME | End: 2024-02-02
Attending: INTERNAL MEDICINE | Admitting: INTERNAL MEDICINE
Payer: MEDICARE

## 2024-02-02 VITALS
SYSTOLIC BLOOD PRESSURE: 106 MMHG | BODY MASS INDEX: 28.44 KG/M2 | HEART RATE: 62 BPM | HEIGHT: 69 IN | TEMPERATURE: 97.6 F | DIASTOLIC BLOOD PRESSURE: 61 MMHG | RESPIRATION RATE: 16 BRPM | OXYGEN SATURATION: 96 % | WEIGHT: 192 LBS

## 2024-02-02 DIAGNOSIS — K57.90 DIVERTICULOSIS: ICD-10-CM

## 2024-02-02 DIAGNOSIS — R14.3 FLATUS: ICD-10-CM

## 2024-02-02 DIAGNOSIS — K63.5 POLYP OF SIGMOID COLON, UNSPECIFIED TYPE: ICD-10-CM

## 2024-02-02 PROCEDURE — 88305 TISSUE EXAM BY PATHOLOGIST: CPT

## 2024-02-02 PROCEDURE — 45380 COLONOSCOPY AND BIOPSY: CPT | Performed by: INTERNAL MEDICINE

## 2024-02-02 PROCEDURE — 3609010600 HC COLONOSCOPY POLYPECTOMY SNARE/COLD BIOPSY: Performed by: INTERNAL MEDICINE

## 2024-02-02 PROCEDURE — 2500000003 HC RX 250 WO HCPCS: Performed by: NURSE ANESTHETIST, CERTIFIED REGISTERED

## 2024-02-02 PROCEDURE — 45385 COLONOSCOPY W/LESION REMOVAL: CPT | Performed by: INTERNAL MEDICINE

## 2024-02-02 PROCEDURE — 6360000002 HC RX W HCPCS: Performed by: NURSE ANESTHETIST, CERTIFIED REGISTERED

## 2024-02-02 PROCEDURE — 2580000003 HC RX 258: Performed by: ANESTHESIOLOGY

## 2024-02-02 PROCEDURE — 7100000010 HC PHASE II RECOVERY - FIRST 15 MIN: Performed by: INTERNAL MEDICINE

## 2024-02-02 PROCEDURE — 7100000011 HC PHASE II RECOVERY - ADDTL 15 MIN: Performed by: INTERNAL MEDICINE

## 2024-02-02 PROCEDURE — 2709999900 HC NON-CHARGEABLE SUPPLY: Performed by: INTERNAL MEDICINE

## 2024-02-02 PROCEDURE — 3700000001 HC ADD 15 MINUTES (ANESTHESIA): Performed by: INTERNAL MEDICINE

## 2024-02-02 PROCEDURE — 3700000000 HC ANESTHESIA ATTENDED CARE: Performed by: INTERNAL MEDICINE

## 2024-02-02 RX ORDER — SODIUM CHLORIDE 9 MG/ML
INJECTION, SOLUTION INTRAVENOUS PRN
Status: DISCONTINUED | OUTPATIENT
Start: 2024-02-02 | End: 2024-02-02 | Stop reason: HOSPADM

## 2024-02-02 RX ORDER — SODIUM CHLORIDE, SODIUM LACTATE, POTASSIUM CHLORIDE, CALCIUM CHLORIDE 600; 310; 30; 20 MG/100ML; MG/100ML; MG/100ML; MG/100ML
INJECTION, SOLUTION INTRAVENOUS CONTINUOUS
Status: DISCONTINUED | OUTPATIENT
Start: 2024-02-02 | End: 2024-02-02 | Stop reason: HOSPADM

## 2024-02-02 RX ORDER — LIDOCAINE HYDROCHLORIDE 10 MG/ML
1 INJECTION, SOLUTION EPIDURAL; INFILTRATION; INTRACAUDAL; PERINEURAL
Status: DISCONTINUED | OUTPATIENT
Start: 2024-02-02 | End: 2024-02-02 | Stop reason: HOSPADM

## 2024-02-02 RX ORDER — LIDOCAINE HYDROCHLORIDE 20 MG/ML
INJECTION, SOLUTION EPIDURAL; INFILTRATION; INTRACAUDAL; PERINEURAL PRN
Status: DISCONTINUED | OUTPATIENT
Start: 2024-02-02 | End: 2024-02-02 | Stop reason: SDUPTHER

## 2024-02-02 RX ORDER — SODIUM CHLORIDE 0.9 % (FLUSH) 0.9 %
5-40 SYRINGE (ML) INJECTION PRN
Status: DISCONTINUED | OUTPATIENT
Start: 2024-02-02 | End: 2024-02-02 | Stop reason: HOSPADM

## 2024-02-02 RX ORDER — PROPOFOL 10 MG/ML
INJECTION, EMULSION INTRAVENOUS PRN
Status: DISCONTINUED | OUTPATIENT
Start: 2024-02-02 | End: 2024-02-02 | Stop reason: SDUPTHER

## 2024-02-02 RX ORDER — SODIUM CHLORIDE 0.9 % (FLUSH) 0.9 %
5-40 SYRINGE (ML) INJECTION EVERY 12 HOURS SCHEDULED
Status: DISCONTINUED | OUTPATIENT
Start: 2024-02-02 | End: 2024-02-02 | Stop reason: HOSPADM

## 2024-02-02 RX ORDER — PROPOFOL 10 MG/ML
INJECTION, EMULSION INTRAVENOUS CONTINUOUS PRN
Status: DISCONTINUED | OUTPATIENT
Start: 2024-02-02 | End: 2024-02-02 | Stop reason: SDUPTHER

## 2024-02-02 RX ADMIN — PHENYLEPHRINE HYDROCHLORIDE 50 MCG: 10 INJECTION INTRAVENOUS at 10:14

## 2024-02-02 RX ADMIN — PHENYLEPHRINE HYDROCHLORIDE 100 MCG: 10 INJECTION INTRAVENOUS at 10:04

## 2024-02-02 RX ADMIN — SODIUM CHLORIDE, POTASSIUM CHLORIDE, SODIUM LACTATE AND CALCIUM CHLORIDE: 600; 310; 30; 20 INJECTION, SOLUTION INTRAVENOUS at 09:08

## 2024-02-02 RX ADMIN — LIDOCAINE HYDROCHLORIDE 40 MG: 20 INJECTION, SOLUTION EPIDURAL; INFILTRATION; INTRACAUDAL; PERINEURAL at 09:52

## 2024-02-02 RX ADMIN — PROPOFOL 40 MG: 10 INJECTION, EMULSION INTRAVENOUS at 09:52

## 2024-02-02 RX ADMIN — PROPOFOL 100 MCG/KG/MIN: 10 INJECTION, EMULSION INTRAVENOUS at 09:52

## 2024-02-02 ASSESSMENT — PAIN - FUNCTIONAL ASSESSMENT
PAIN_FUNCTIONAL_ASSESSMENT: 0-10
PAIN_FUNCTIONAL_ASSESSMENT: NONE - DENIES PAIN

## 2024-02-02 NOTE — H&P
HISTORY and PHYSICAL  [facility]       NAME:  Heriberto Padron  MRN: [id number]   YOB: 1950   Date: 2/2/2024   Age: 73 y.o.  Gender: male       COMPLAINT AND PRESENT HISTORY:     Heriberto Padron is 73 y.o.,   male, having a Diagnostic Colonoscopy, for hx of: Pre-Op Diagnosis Codes:     * Flatus      * Polyp of sigmoid colon, unspecified type      * Diverticulosis      Prior Colonoscopy was done last in 2018 with polypectomy.     Patient has hx of Colon Polyps.  Pt also has hx of Diverticulosis, denies any recent flare ups.     Denies abdominal pain including bloating. Pat admits to having more gas.    No changes in bowel habits. Patient admits to having fecal urgency.    No diarrhea, constipation, blood in stools, black tarry stools.  Pt denies  hx of hemorrhoids.    No changes in appetite and unintended weight loss. Denies any nausea or vomiting. No  heartburn, indigestion or acid reflux. Denies Family Hx of colon cancer.    Medical history reviewed:   Patient voices feeling well today. Denies any recent fever or chills, chest pain/pressure.  Pt reports reports some  SOB.     Patient has been NPO since midnight. No blood thinners past   Pt took x2 bp pills this am    Patient states has taken all bowel prep with clear outcome.    Patient denies any personal or family problems with anesthesia.      PAST MEDICAL HISTORY     Past Medical History:   Diagnosis Date    Adrenal nodule (HCC)     Asthma     MILD    BPH with urinary obstruction     Cancer (HCC)     lung cancer-minimally invasive mucinous adenocarcinoma    Chronic bronchitis (HCC)     MILD    COPD (chronic obstructive pulmonary disease) (HCC)     MILD    Diverticulosis     GERD (gastroesophageal reflux disease)     [place] (hard of hearing)     Hyperlipidemia     Hypertension     PCP DR ANGEL MCCABE    SASCHA on CPAP     Pulmonary nodule     PVC's (premature ventricular contractions)     Retention of urine     after     Drug use: No     Social Determinants of Health     Physical Activity: Sufficiently Active (10/24/2022)    Exercise Vital Sign     Days of Exercise per Week: 4 days     Minutes of Exercise per Session: 60 min   Recent Concern: Physical Activity - Insufficiently Active (10/19/2022)    Exercise Vital Sign     Days of Exercise per Week: 2 days     Minutes of Exercise per Session: 30 min           REVIEW OF SYSTEMS      Allergies   Allergen Reactions    Tetanus Toxoids Other (See Comments)     FEVER, SWEATING.    Tetanus Immune Globulin        No current facility-administered medications on file prior to encounter.     Current Outpatient Medications on File Prior to Encounter   Medication Sig Dispense Refill    hydroCHLOROthiazide (HYDRODIURIL) 12.5 MG tablet Take 1 tablet by mouth daily 90 tablet 3    albuterol sulfate HFA (PROVENTIL;VENTOLIN;PROAIR) 108 (90 Base) MCG/ACT inhaler       Multiple Vitamins-Minerals (THERAPEUTIC MULTIVITAMIN-MINERALS) tablet Take 1 tablet by mouth daily      ALBUTEROL SULFATE HFA IN          Negative except for what is mentioned in the HPI.     GENERAL PHYSICAL EXAM     Vitals :   See vital signs in RN flow sheet.       GENERAL APPEARANCE:   Heriberto Padron is 73 y.o., male, moderately obese, nourished, conscious, alert.  Does not appear to be distress or pain at this time.                            SKIN:  Warm, dry, no cyanosis or jaundice.              HEAD:  Normocephalic, atraumatic, no swelling or tenderness.                 EYES:  Pupils equal, reactive to light.           EARS:  No discharge, no marked hearing loss.               NOSE:  No rhinorrhea, epistaxis or septal deformity.                 THROAT:  Not congested. No ulceration bleeding or discharge.                  NECK:  No stiffness, trachea central.  No palpable masses or L.N.                 CHEST:  Symmetrical and equal on expansion.                 HEART:  RRR . No audible murmurs or gallops.

## 2024-02-02 NOTE — OP NOTE
COLONOSCOPY    DATE OF PROCEDURE: 2/2/2024    SURGEON: Perfecto Jean Baptiste MD    ASSISTANT: None    PREOPERATIVE DIAGNOSIS: Polyp surveillance colonoscopy    POSTOPERATIVE DIAGNOSIS: Diverticulosis, multiple polyps as described    OPERATION: Total colonoscopy, snare polypectomy hepatic flexure, excision of polyp with biopsy forceps from the right colon and transverse colon    ANESTHESIA: MAC    ESTIMATED BLOOD LOSS: None    COMPLICATIONS: None     SPECIMENS:  Was Obtained: Polyp hepatic flexure, polyp right colon, questionable lipoma near the ileocecal valve, polyp transverse colon    HISTORY: The patient is a 73 y.o. year old male with history of above preop diagnosis.  I recommended colonoscopy with possible biopsy or polypectomy and I explained the risk, benefits, expected outcome, and alternatives to the procedure.  Risks included but are not limited to bleeding, infection, respiratory distress, hypotension, and perforation of the colon and possibility of missing a lesion.  The patient understands and is in agreement.      PROCEDURE:  The patient's SPO2 remained above 90% throughout the procedure. Digital rectal exam was normal.  The colonoscope was inserted through the anus into the rectum and advanced under direct vision to the cecum without difficulty.  Terminal ileum was examined for approximately 2 inches.  The prep was good.      Findings:  Terminal ileum: normal    Cecum/Ascending colon: abnormal: Also examined in the retroflexed view.  Nearby ileocecal valve there is polyp-like lesion which is about 7 mm seen.  Biopsies taken.  After biopsies that he has typical signs of lipoma.  In the upper right colon there were couple of polyps which are about 3 to 4 mm seen, excised with biopsy forceps.    Near the ileocecal valve a polyp which is about 5 to 7 mm, cold snare.      Transverse colon: abnormal: In the mid transverse colon a polyp which is about 3 mm seen, removed with biopsy

## 2024-02-02 NOTE — DISCHARGE INSTRUCTIONS
uses small amounts of radiation to make a picture of the inside of the body   Barium enema x-ray exam that uses contrast to help better see the colon   Diagnostic colonoscopy or sigmoidoscopyexamination of the inside of the intestine with an endoscope   Your colon must be completely cleaned before the procedure. Any stool left in the intestine will block the view. This preparation may start several days before the procedure. Follow your doctor's instructions, which may include any of the following cleansing methods:   Enemas fluid introduced into the rectum to stimulate a bowel movement   Laxativesmedicines that cause you to have soft bowel movements   A clear-liquid diet   Oral cathartic medicinesa large container of fluid to drink, which stimulates a bowel movement   Leading up to your procedure:   Talk to your doctor about your medicines. You may be asked to stop taking some medicines up to one week before the procedure, like:   Anti-inflammatory drugs (eg, aspirin)   Blood thinners, like clopidogrel (Plavix) or warfarin (Coumadin)   Iron supplements or vitamins containing iron.   The night before, eat a light meal. Do not eat or drink anything after midnight.   Wear comfortable clothing.   If you have diabetes, ask your doctor if you need to adjust your insulin dose.   Arrange for a ride home after the procedure.   Anesthesia    You will receive a sedative. This will help you relax. You will be drowsy but awake.   Description of the Procedure    You will be asked to lie on your side or on your back. A scope, a long flexible tube with a camera on the end, will be inserted through the anus. It will be slowly pushed through the rectum to the colon. The scope will also add air to open the colon.   Using the scope, the doctor will locate the polyp. The polyp will be snipped off with a wire snare from the scope. In some cases, the polyp may be destroyed with an electric current. The electric current is also used to    Signs of infection, including fever and chills   Redness, swelling, increasing pain, excessive bleeding, or discharge from the rectum (Up to cup of blood per day can be expected for up to 3-4 days following your polypectomy.)   Black, tarry stools   Severe abdominal pain   Hard, swollen abdomen   Inability to pass gas or stool   Cough , shortness of breath, chest pain, or severe nausea or vomiting   New, unexplained symptoms   In case of emergency,  CALL 911  .     Last Reviewed: December 2010 Janell Kerr MD   Updated: 4/7/2011     PATIENT INSTRUCTIONS  DIVERTICULOSIS    FOLLOW-UP:  Please make an appointment with your physician as directed.  Call your physician immediately if you have any fevers greater than 102.5,  increasing abdominal pain, GI bleeding (from the colon or rectum),or nausea/vomiting.      CAUSE:  Some people may have congenital diverticulosis, but most people develop diverticulosis around or after age 40 due to a low-fiber, high-fat diet, along with inadequate fluid intake. This is very prevalent in the industrialized world where we eat a lot of processed, low fiber foods.  Diverticuli develop due to firm stool and high pressures in the colon, along with secondary spasm, which causes outpouchings to occus where the small arteries penetrate the wall of the colon to feed the internal lining.  These occur most commonly on the left side and lower portions of the colon.  Diverticuli can then cause bleeding from the arteries at these sites of weakness when they rupture or the diveritculi can get blocked with stool and debris and become obstructed, causing diverticulosis, which is due to an infection.  When these are infected, diverticulitis, it is often treated with antibiotics and bowel rest, but when severe, recurrent, or if rupture of the colon occurs, it may require surgery.  Following appropriate dietary changes and taking the proper precautions is therefore very important.    DIET:  You should

## 2024-02-02 NOTE — ANESTHESIA PRE PROCEDURE
Department of Anesthesiology  Preprocedure Note       Name:  Heriberto Padron   Age:  73 y.o.  :  1950                                          MRN:  [id number]         Date:  2024      Surgeon: Surgeon(s):  Perfecto Jean Baptiste MD    Procedure: Procedure(s):  COLONOSCOPY DIAGNOSTIC    Medications prior to admission:   Prior to Admission medications    Medication Sig Start Date End Date Taking? Authorizing Provider   oxyBUTYnin (DITROPAN XL) 15 MG extended release tablet Take 1 tablet by mouth daily 24   Shasha Keen, APRN - CNP   losartan (COZAAR) 100 MG tablet Take 1 tablet by mouth daily TAKE 1 TABLET BY MOUTH DAILY (TAKE WITH HYDROCHLOROTHIAZIDE 12.5 MG TABLETS) 24   Jessica Diaz MD   atorvastatin (LIPITOR) 40 MG tablet TAKE 1 TABLET BY MOUTH EVERY DAY 24   Jessica Diaz MD   hydroCHLOROthiazide (HYDRODIURIL) 12.5 MG tablet Take 1 tablet by mouth daily 10/4/23   Jessica Diaz MD   albuterol sulfate HFA (PROVENTIL;VENTOLIN;PROAIR) 108 (90 Base) MCG/ACT inhaler  3/9/23   Radha Marrero MD   Multiple Vitamins-Minerals (THERAPEUTIC MULTIVITAMIN-MINERALS) tablet Take 1 tablet by mouth daily    Radha Marrero MD   ALBUTEROL SULFATE HFA IN  22   Radha Marrero MD       Current medications:    Current Facility-Administered Medications   Medication Dose Route Frequency Provider Last Rate Last Admin    sodium chloride flush 0.9 % injection 5-40 mL  5-40 mL IntraVENous 2 times per day Luis M Turpin MD        sodium chloride flush 0.9 % injection 5-40 mL  5-40 mL IntraVENous PRN Luis M Turpin MD        0.9 % sodium chloride infusion   IntraVENous PRN Luis M Turpin MD        lactated ringers IV soln infusion   IntraVENous Continuous Luis M Turpin MD        lidocaine PF 1 % injection 1 mL  1 mL IntraDERmal Once PRN Luis M Turpin MD           Allergies:    Allergies   Allergen Reactions    Tetanus Toxoids Other (See Comments)     Fever,

## 2024-02-05 LAB — SURGICAL PATHOLOGY REPORT: NORMAL

## 2024-02-13 DIAGNOSIS — K63.5 POLYP OF COLON, UNSPECIFIED PART OF COLON, UNSPECIFIED TYPE: ICD-10-CM

## 2024-02-16 ENCOUNTER — OFFICE VISIT (OUTPATIENT)
Dept: GASTROENTEROLOGY | Age: 74
End: 2024-02-16
Payer: MEDICARE

## 2024-02-16 VITALS
HEIGHT: 69 IN | WEIGHT: 204 LBS | SYSTOLIC BLOOD PRESSURE: 153 MMHG | HEART RATE: 63 BPM | BODY MASS INDEX: 30.21 KG/M2 | DIASTOLIC BLOOD PRESSURE: 65 MMHG

## 2024-02-16 DIAGNOSIS — D12.6 TUBULAR ADENOMA OF COLON: Primary | ICD-10-CM

## 2024-02-16 DIAGNOSIS — K57.90 DIVERTICULOSIS: ICD-10-CM

## 2024-02-16 PROCEDURE — 99213 OFFICE O/P EST LOW 20 MIN: CPT | Performed by: NURSE PRACTITIONER

## 2024-02-16 PROCEDURE — 1123F ACP DISCUSS/DSCN MKR DOCD: CPT | Performed by: NURSE PRACTITIONER

## 2024-02-16 NOTE — PROGRESS NOTES
patient in detail.  Multiple subcentimeter tubular adenomas.  Recommend polyp surveillance colonoscopy 3 years.  Patient has diverticulosis.  Advised to follow high fiber diet and to take precautions to avoid constipation and straining.    RTO as needed  Polyp surveillance colonoscopy 3 years    Thank you for allowing me to participate in the care of Mr. Padron. For any further questions please do not hesitate to contact me.    I have reviewed and agree with the ROS entered by the MA/OCHOAN.         SYD Varma    [facility]  Office #: [phone]

## 2024-03-05 ENCOUNTER — OFFICE VISIT (OUTPATIENT)
Dept: UROLOGY | Age: 74
End: 2024-03-05
Payer: MEDICARE

## 2024-03-05 VITALS
HEIGHT: 69 IN | TEMPERATURE: 97.8 F | BODY MASS INDEX: 30.21 KG/M2 | HEART RATE: 64 BPM | WEIGHT: 204 LBS | DIASTOLIC BLOOD PRESSURE: 87 MMHG | SYSTOLIC BLOOD PRESSURE: 133 MMHG | OXYGEN SATURATION: 96 %

## 2024-03-05 DIAGNOSIS — N39.41 URGE INCONTINENCE: ICD-10-CM

## 2024-03-05 DIAGNOSIS — Z92.89 HISTORY OF PHOTOVAPORIZATION OF PROSTATE: ICD-10-CM

## 2024-03-05 DIAGNOSIS — Z12.5 PROSTATE CANCER SCREENING: ICD-10-CM

## 2024-03-05 DIAGNOSIS — R39.14 BENIGN PROSTATIC HYPERPLASIA WITH INCOMPLETE BLADDER EMPTYING: Primary | ICD-10-CM

## 2024-03-05 DIAGNOSIS — N40.1 BENIGN PROSTATIC HYPERPLASIA WITH INCOMPLETE BLADDER EMPTYING: Primary | ICD-10-CM

## 2024-03-05 DIAGNOSIS — R35.0 URINARY FREQUENCY: ICD-10-CM

## 2024-03-05 LAB — POST VOID RESIDUAL (PVR): 84 ML

## 2024-03-05 PROCEDURE — 99214 OFFICE O/P EST MOD 30 MIN: CPT | Performed by: NURSE PRACTITIONER

## 2024-03-05 PROCEDURE — 51798 US URINE CAPACITY MEASURE: CPT | Performed by: NURSE PRACTITIONER

## 2024-03-05 PROCEDURE — 1123F ACP DISCUSS/DSCN MKR DOCD: CPT | Performed by: NURSE PRACTITIONER

## 2024-03-05 ASSESSMENT — ENCOUNTER SYMPTOMS
VOMITING: 0
EYE REDNESS: 0
DIARRHEA: 0
CONSTIPATION: 0
SHORTNESS OF BREATH: 0
WHEEZING: 0
BACK PAIN: 0
NAUSEA: 0
ABDOMINAL PAIN: 0
COUGH: 0
EYE PAIN: 0

## 2024-03-05 NOTE — PROGRESS NOTES
Review of Systems   Constitutional:  Negative for chills, fatigue and fever.   Eyes:  Negative for pain, redness and visual disturbance.   Respiratory:  Negative for cough, shortness of breath and wheezing.    Cardiovascular:  Negative for chest pain and leg swelling.   Gastrointestinal:  Negative for abdominal pain, constipation, diarrhea, nausea and vomiting.   Genitourinary:  Negative for difficulty urinating, dysuria, flank pain, frequency, hematuria, scrotal swelling, testicular pain and urgency.   Musculoskeletal:  Negative for back pain, joint swelling and myalgias.   Skin:  Negative for rash and wound.   Neurological:  Negative for dizziness, tremors, weakness and numbness.   Hematological:  Does not bruise/bleed easily.     
RETROPERITONEUM    CT BIOPSY ABDOMEN RETROPERITONEUM  11/02/2022    CT BIOPSY ABDOMEN RETROPERITONEUM    CYST REMOVAL      neck POSTERIOR    CYSTOSCOPY      PRE-MALIGNANT / BENIGN SKIN LESION EXCISION      UPPER LEFT POSTERIOR BACK.    PROSTATE BIOPSY  10/06/2020    PROSTATE BIOPSY N/A 10/06/2020    PROSTATE BIOPSY, ULTRASOUND (CONFIRMED W/ US-VERITO) performed by Dmitriy Lynch MD at [facility] OR    THORACOTOMY Left 01/13/2023    VATS and left upper lobe wedge resection at [facility]    TURP N/A 4/25/2023    CYSTOSCOPY GREENLIGHT VAPORIZATION OF PROSTATE performed by Dmitriy Lynch MD at [facility] OR     Family History   Problem Relation Age of Onset    Lung Cancer Mother         LUNG    Prostate Cancer Father         WITH METS TO BONE    Other Sister         diverticular disease     Outpatient Medications Marked as Taking for the 3/5/24 encounter (Office Visit) with Shasha Keen APRN - CNP   Medication Sig Dispense Refill    vibegron (GEMTESA) 75 MG TABS tablet Take 1 tablet by mouth daily 30 tablet 5    oxyBUTYnin (DITROPAN XL) 15 MG extended release tablet Take 1 tablet by mouth daily 90 tablet 3    losartan (COZAAR) 100 MG tablet Take 1 tablet by mouth daily TAKE 1 TABLET BY MOUTH DAILY (TAKE WITH HYDROCHLOROTHIAZIDE 12.5 MG TABLETS) 90 tablet 3    atorvastatin (LIPITOR) 40 MG tablet TAKE 1 TABLET BY MOUTH EVERY DAY 90 tablet 3    hydroCHLOROthiazide (HYDRODIURIL) 12.5 MG tablet Take 1 tablet by mouth daily 90 tablet 3    albuterol sulfate HFA (PROVENTIL;VENTOLIN;PROAIR) 108 (90 Base) MCG/ACT inhaler       Multiple Vitamins-Minerals (THERAPEUTIC MULTIVITAMIN-MINERALS) tablet Take 1 tablet by mouth daily         Tetanus toxoids and Tetanus immune globulin  Social History     Tobacco Use   Smoking Status Former    Current packs/day: 0.00    Average packs/day: 1 pack/day for 49.0 years (49.0 ttl pk-yrs)    Types: Cigarettes    Start date: 1965    Quit date: 2014    Years since quitting: 10.1   Smokeless Tobacco Never

## 2024-04-15 ENCOUNTER — HOSPITAL ENCOUNTER (OUTPATIENT)
Age: 74
Setting detail: SPECIMEN
Discharge: HOME | End: 2024-04-15

## 2024-04-15 LAB — PSA SERPL-MCNC: 0.7 NG/ML (ref 0–4)

## 2024-04-16 ENCOUNTER — OFFICE VISIT (OUTPATIENT)
Dept: UROLOGY | Age: 74
End: 2024-04-16
Payer: MEDICARE

## 2024-04-16 VITALS
HEIGHT: 69 IN | TEMPERATURE: 97.2 F | HEART RATE: 60 BPM | DIASTOLIC BLOOD PRESSURE: 66 MMHG | OXYGEN SATURATION: 96 % | WEIGHT: 203 LBS | SYSTOLIC BLOOD PRESSURE: 133 MMHG | BODY MASS INDEX: 30.07 KG/M2

## 2024-04-16 DIAGNOSIS — Z92.89 HISTORY OF PHOTOVAPORIZATION OF PROSTATE: ICD-10-CM

## 2024-04-16 DIAGNOSIS — N32.81 OAB (OVERACTIVE BLADDER): ICD-10-CM

## 2024-04-16 DIAGNOSIS — Z12.5 PROSTATE CANCER SCREENING: ICD-10-CM

## 2024-04-16 DIAGNOSIS — R39.14 BENIGN PROSTATIC HYPERPLASIA WITH INCOMPLETE BLADDER EMPTYING: Primary | ICD-10-CM

## 2024-04-16 DIAGNOSIS — N40.1 BENIGN PROSTATIC HYPERPLASIA WITH INCOMPLETE BLADDER EMPTYING: Primary | ICD-10-CM

## 2024-04-16 PROCEDURE — 1123F ACP DISCUSS/DSCN MKR DOCD: CPT | Performed by: NURSE PRACTITIONER

## 2024-04-16 PROCEDURE — 99213 OFFICE O/P EST LOW 20 MIN: CPT | Performed by: NURSE PRACTITIONER

## 2024-04-16 ASSESSMENT — ENCOUNTER SYMPTOMS
DIARRHEA: 0
CONSTIPATION: 0
NAUSEA: 0
BACK PAIN: 0
COUGH: 0
EYE REDNESS: 0
WHEEZING: 0
SHORTNESS OF BREATH: 0
ABDOMINAL PAIN: 0
VOMITING: 0
EYE PAIN: 0

## 2024-04-16 NOTE — PROGRESS NOTES
[facility] UROLOGY [facility]  [address]  [facility] [address]  Dept: [phone]    [facility] Urology Office Note - Established    Patient:  Heriberto Padron  YOB: 1950  Date: 4/16/2024    The patient is a 73 y.o. male who presents todayfor evaluation of the following problems:   Chief Complaint   Patient presents with    Other     6 Weeks Gemtesa and PSA        HPI  Patient is a 73-year-old male with a history of BPH and overactive bladder presenting for follow-up.  He had greenlight laser 4/25/2023 with Dr. Lynch and he is no longer on Flomax.  His stream is strong and he feels he empties his bladder well.  His PVRs have been relatively low in the past.  He does continue to struggle with some urinary urgency and frequency.  He was previously on oxybutynin 15 mg p.o. daily for this issue but was still having some plaints of urgency and frequency.  He was then switched to Gemtesa samples.  He did not notice a huge difference with this.  Other than that his dry mouth was a little bit better.  He has identified some bladder irritants including coffee and beer.  His AUA symptom score is 3. PSA is 0.7, he had a negative bx in 2020. He denies any other  concerns.     Summary of old records: N/A    Additional History: N/A    Procedures Today: N/A    Urinalysis today:  No results found for this visit on 04/16/24.  Last several PSA's:  Lab Results   Component Value Date    PSA 0.70 04/15/2024    PSA 0.60 10/11/2023    PSA 1.18 10/20/2022     Last total testosterone:  Lab Results   Component Value Date    TESTOSTERONE 367 07/22/2013       AUA Symptom Score (4/16/2024):  INCOMPLETE EMPTYING: How often have you had the sensation of not emptying your bladder?: Not at all  FREQUENCY: How often do you have to urinate less than every two hours?: Less than 1 to 5 times  INTERMITTENCY: How often have you found you stopped and started again

## 2024-04-23 PROBLEM — I10 ESSENTIAL HYPERTENSION: Status: ACTIVE | Noted: 2024-04-23

## 2024-04-24 ENCOUNTER — TELEPHONE (OUTPATIENT)
Dept: ONCOLOGY | Age: 74
End: 2024-04-24

## 2024-05-02 ENCOUNTER — HOSPITAL ENCOUNTER (OUTPATIENT)
Dept: CT IMAGING | Age: 74
Discharge: HOME | End: 2024-05-04
Attending: FAMILY MEDICINE
Payer: MEDICARE

## 2024-05-02 ENCOUNTER — HOSPITAL ENCOUNTER (OUTPATIENT)
Age: 74
Setting detail: SPECIMEN
Discharge: HOME | End: 2024-05-02

## 2024-05-02 VITALS — WEIGHT: 200 LBS | HEIGHT: 69 IN | BODY MASS INDEX: 29.62 KG/M2

## 2024-05-02 DIAGNOSIS — Z87.891 PERSONAL HISTORY OF TOBACCO USE: ICD-10-CM

## 2024-05-02 DIAGNOSIS — I10 ESSENTIAL HYPERTENSION: ICD-10-CM

## 2024-05-02 DIAGNOSIS — R73.03 PRE-DIABETES: ICD-10-CM

## 2024-05-02 DIAGNOSIS — Z12.5 SCREENING FOR MALIGNANT NEOPLASM OF PROSTATE: ICD-10-CM

## 2024-05-02 LAB
ALBUMIN SERPL-MCNC: 4.2 G/DL (ref 3.5–5.2)
ALBUMIN/GLOB SERPL: 2 {RATIO} (ref 1–2.5)
ALP SERPL-CCNC: 68 U/L (ref 40–129)
ALT SERPL-CCNC: 132 U/L (ref 10–50)
ANION GAP SERPL CALCULATED.3IONS-SCNC: 11 MMOL/L (ref 9–16)
AST SERPL-CCNC: 88 U/L (ref 10–50)
BASOPHILS # BLD: 0.03 K/UL (ref 0–0.2)
BASOPHILS NFR BLD: 0 % (ref 0–2)
BILIRUB SERPL-MCNC: 0.6 MG/DL (ref 0–1.2)
BUN SERPL-MCNC: 19 MG/DL (ref 8–23)
CALCIUM SERPL-MCNC: 7.9 MG/DL (ref 8.6–10.4)
CHLORIDE SERPL-SCNC: 105 MMOL/L (ref 98–107)
CHOLEST SERPL-MCNC: 70 MG/DL (ref 0–199)
CHOLESTEROL/HDL RATIO: 3
CO2 SERPL-SCNC: 22 MMOL/L (ref 20–31)
CREAT SERPL-MCNC: 0.8 MG/DL (ref 0.7–1.2)
EOSINOPHIL # BLD: 0.26 K/UL (ref 0–0.44)
EOSINOPHILS RELATIVE PERCENT: 3 % (ref 1–4)
ERYTHROCYTE [DISTWIDTH] IN BLOOD BY AUTOMATED COUNT: 14.2 % (ref 11.8–14.4)
EST. AVERAGE GLUCOSE BLD GHB EST-MCNC: 123 MG/DL
GFR, ESTIMATED: >90 ML/MIN/1.73M2
GLUCOSE SERPL-MCNC: 103 MG/DL (ref 74–99)
HBA1C MFR BLD: 5.9 % (ref 4–6)
HCT VFR BLD AUTO: 40.3 % (ref 40.7–50.3)
HDLC SERPL-MCNC: 22 MG/DL
HGB BLD-MCNC: 13.7 G/DL (ref 13–17)
IMM GRANULOCYTES # BLD AUTO: 0.04 K/UL (ref 0–0.3)
IMM GRANULOCYTES NFR BLD: 1 %
LDLC SERPL CALC-MCNC: 39 MG/DL (ref 0–100)
LYMPHOCYTES NFR BLD: 1.71 K/UL (ref 1.1–3.7)
LYMPHOCYTES RELATIVE PERCENT: 22 % (ref 24–43)
MCH RBC QN AUTO: 31.4 PG (ref 25.2–33.5)
MCHC RBC AUTO-ENTMCNC: 34 G/DL (ref 28.4–34.8)
MCV RBC AUTO: 92.4 FL (ref 82.6–102.9)
MONOCYTES NFR BLD: 0.83 K/UL (ref 0.1–1.2)
MONOCYTES NFR BLD: 11 % (ref 3–12)
NEUTROPHILS NFR BLD: 63 % (ref 36–65)
NEUTS SEG NFR BLD: 5.04 K/UL (ref 1.5–8.1)
NRBC BLD-RTO: 0 PER 100 WBC
PLATELET # BLD AUTO: 198 K/UL (ref 138–453)
PMV BLD AUTO: 9.4 FL (ref 8.1–13.5)
POTASSIUM SERPL-SCNC: 3.4 MMOL/L (ref 3.7–5.3)
PROT SERPL-MCNC: 6.5 G/DL (ref 6.6–8.7)
PSA SERPL-MCNC: 0.7 NG/ML (ref 0–4)
RBC # BLD AUTO: 4.36 M/UL (ref 4.21–5.77)
SODIUM SERPL-SCNC: 138 MMOL/L (ref 136–145)
TRIGL SERPL-MCNC: 46 MG/DL
VLDLC SERPL CALC-MCNC: 9 MG/DL
WBC OTHER # BLD: 7.9 K/UL (ref 3.5–11.3)

## 2024-05-02 PROCEDURE — 71271 CT THORAX LUNG CANCER SCR C-: CPT

## 2024-05-02 NOTE — RESULT ENCOUNTER NOTE
Nate - your CT lung screen shows no suspicious nodules. We'll recheck in one year.     The scan did show a mass on your adrenal gland, but it is not suspicious for any abnormality and does not require further study.

## 2024-05-03 NOTE — RESULT ENCOUNTER NOTE
Call - liver enzymes elevated and potassium low.     Stop alcohol and Tylenol to improve liver enzymes, recheck liver enzymes in one week (no fasting).    Stop hydrochlorothiazide and recheck potassium at the same time (no fasting).     If the liver enzymes don't improve, may need to stop atorvastatin, but keep taking for now.     Labs ordered.

## 2024-05-13 ENCOUNTER — HOSPITAL ENCOUNTER (OUTPATIENT)
Age: 74
Setting detail: SPECIMEN
Discharge: HOME | End: 2024-05-13

## 2024-05-13 DIAGNOSIS — Z11.59 ENCOUNTER FOR SCREENING FOR OTHER VIRAL DISEASES: ICD-10-CM

## 2024-05-13 DIAGNOSIS — R74.01 ELEVATED TRANSAMINASE LEVEL: ICD-10-CM

## 2024-05-13 LAB
FERRITIN SERPL-MCNC: 64 NG/ML (ref 30–400)
HBV SURFACE AG SERPL QL IA: NONREACTIVE
HCV AB SERPL QL IA: NONREACTIVE
IRON SATN MFR SERPL: 32 % (ref 20–55)
IRON SERPL-MCNC: 105 UG/DL (ref 61–157)
TIBC SERPL-MCNC: 332 UG/DL (ref 250–450)
UNSATURATED IRON BINDING CAPACITY: 227 UG/DL (ref 112–347)

## 2024-05-15 NOTE — RESULT ENCOUNTER NOTE
Nate - your additional labs did not show any reason for your liver enzymes to be elevated. Stop your atorvastatin, and recheck your labs in mid-June. I entered the lab orders already.

## 2024-06-17 ENCOUNTER — HOSPITAL ENCOUNTER (OUTPATIENT)
Age: 74
Setting detail: SPECIMEN
Discharge: HOME | End: 2024-06-17

## 2024-06-17 DIAGNOSIS — R74.01 ELEVATED TRANSAMINASE LEVEL: ICD-10-CM

## 2024-06-18 LAB
ALBUMIN SERPL-MCNC: 4.6 G/DL (ref 3.5–5.2)
ALBUMIN/GLOB SERPL: 2 {RATIO} (ref 1–2.5)
ALP SERPL-CCNC: 89 U/L (ref 40–129)
ALT SERPL-CCNC: 43 U/L (ref 10–50)
AST SERPL-CCNC: 33 U/L (ref 10–50)
BILIRUB DIRECT SERPL-MCNC: <0.2 MG/DL (ref 0–0.3)
BILIRUB INDIRECT SERPL-MCNC: NORMAL MG/DL (ref 0–1)
BILIRUB SERPL-MCNC: 0.9 MG/DL (ref 0–1.2)
GLOBULIN SER CALC-MCNC: 2.7 G/DL
PROT SERPL-MCNC: 7.3 G/DL (ref 6.6–8.7)

## 2024-11-04 ENCOUNTER — HOSPITAL ENCOUNTER (OUTPATIENT)
Age: 74
Setting detail: SPECIMEN
Discharge: HOME | End: 2024-11-04

## 2024-11-04 DIAGNOSIS — I10 ESSENTIAL HYPERTENSION: ICD-10-CM

## 2024-11-04 DIAGNOSIS — E78.2 MIXED HYPERLIPIDEMIA: Chronic | ICD-10-CM

## 2024-11-04 DIAGNOSIS — Z12.5 SCREENING FOR MALIGNANT NEOPLASM OF PROSTATE: ICD-10-CM

## 2024-11-04 LAB
ALBUMIN SERPL-MCNC: 4.4 G/DL (ref 3.5–5.2)
ALBUMIN/GLOB SERPL: 1.7 {RATIO} (ref 1–2.5)
ALP SERPL-CCNC: 66 U/L (ref 40–129)
ALT SERPL-CCNC: 53 U/L (ref 10–50)
ANION GAP SERPL CALCULATED.3IONS-SCNC: 10 MMOL/L (ref 9–16)
AST SERPL-CCNC: 38 U/L (ref 10–50)
BILIRUB SERPL-MCNC: 0.7 MG/DL (ref 0–1.2)
BUN SERPL-MCNC: 19 MG/DL (ref 8–23)
CALCIUM SERPL-MCNC: 9.4 MG/DL (ref 8.6–10.4)
CHLORIDE SERPL-SCNC: 101 MMOL/L (ref 98–107)
CHOLEST SERPL-MCNC: 142 MG/DL (ref 0–199)
CHOLESTEROL/HDL RATIO: 4.3
CO2 SERPL-SCNC: 26 MMOL/L (ref 20–31)
CREAT SERPL-MCNC: 0.7 MG/DL (ref 0.7–1.2)
GFR, ESTIMATED: >90 ML/MIN/1.73M2
GLUCOSE SERPL-MCNC: 102 MG/DL (ref 74–99)
HDLC SERPL-MCNC: 33 MG/DL
LDLC SERPL CALC-MCNC: 78 MG/DL (ref 0–100)
POTASSIUM SERPL-SCNC: 4.5 MMOL/L (ref 3.7–5.3)
PROT SERPL-MCNC: 7 G/DL (ref 6.6–8.7)
PSA SERPL-MCNC: 0.6 NG/ML (ref 0–4)
SODIUM SERPL-SCNC: 137 MMOL/L (ref 136–145)
TRIGL SERPL-MCNC: 156 MG/DL
VLDLC SERPL CALC-MCNC: 31 MG/DL (ref 1–30)

## 2024-11-05 LAB
BASOPHILS # BLD: 0.07 K/UL (ref 0–0.2)
BASOPHILS NFR BLD: 1 % (ref 0–2)
EOSINOPHIL # BLD: 0.26 K/UL (ref 0–0.44)
EOSINOPHILS RELATIVE PERCENT: 3 % (ref 1–4)
ERYTHROCYTE [DISTWIDTH] IN BLOOD BY AUTOMATED COUNT: 14.3 % (ref 11.8–14.4)
HCT VFR BLD AUTO: 46.7 % (ref 40.7–50.3)
HGB BLD-MCNC: 15.2 G/DL (ref 13–17)
IMM GRANULOCYTES # BLD AUTO: 0.08 K/UL (ref 0–0.3)
IMM GRANULOCYTES NFR BLD: 1 %
LYMPHOCYTES NFR BLD: 1.81 K/UL (ref 1.1–3.7)
LYMPHOCYTES RELATIVE PERCENT: 21 % (ref 24–43)
MCH RBC QN AUTO: 31 PG (ref 25.2–33.5)
MCHC RBC AUTO-ENTMCNC: 32.5 G/DL (ref 28.4–34.8)
MCV RBC AUTO: 95.1 FL (ref 82.6–102.9)
MONOCYTES NFR BLD: 0.71 K/UL (ref 0.1–1.2)
MONOCYTES NFR BLD: 8 % (ref 3–12)
NEUTROPHILS NFR BLD: 66 % (ref 36–65)
NEUTS SEG NFR BLD: 5.57 K/UL (ref 1.5–8.1)
NRBC BLD-RTO: 0 PER 100 WBC
PLATELET # BLD AUTO: 182 K/UL (ref 138–453)
PMV BLD AUTO: 9.2 FL (ref 8.1–13.5)
RBC # BLD AUTO: 4.91 M/UL (ref 4.21–5.77)
WBC OTHER # BLD: 8.5 K/UL (ref 3.5–11.3)

## 2024-11-05 NOTE — RESULT ENCOUNTER NOTE
Nate your labs are good except one liver enzyme is elevated. Be mindful of the amount of alcohol you drink, and take no more than 4 regular or extra strength Tylenol a day.

## 2025-02-03 ENCOUNTER — OFFICE VISIT (OUTPATIENT)
Dept: GASTROENTEROLOGY | Age: 75
End: 2025-02-03
Payer: MEDICARE

## 2025-02-03 VITALS
TEMPERATURE: 97.5 F | DIASTOLIC BLOOD PRESSURE: 75 MMHG | WEIGHT: 203 LBS | BODY MASS INDEX: 29.98 KG/M2 | SYSTOLIC BLOOD PRESSURE: 128 MMHG

## 2025-02-03 DIAGNOSIS — D12.6 TUBULAR ADENOMA OF COLON: Primary | ICD-10-CM

## 2025-02-03 DIAGNOSIS — K57.90 DIVERTICULOSIS: ICD-10-CM

## 2025-02-03 DIAGNOSIS — K63.5 POLYP OF COLON, UNSPECIFIED PART OF COLON, UNSPECIFIED TYPE: ICD-10-CM

## 2025-02-03 DIAGNOSIS — R15.2 INCONTINENCE OF FECES WITH FECAL URGENCY: ICD-10-CM

## 2025-02-03 DIAGNOSIS — R15.9 INCONTINENCE OF FECES WITH FECAL URGENCY: ICD-10-CM

## 2025-02-03 PROCEDURE — 3074F SYST BP LT 130 MM HG: CPT | Performed by: INTERNAL MEDICINE

## 2025-02-03 PROCEDURE — 3078F DIAST BP <80 MM HG: CPT | Performed by: INTERNAL MEDICINE

## 2025-02-03 PROCEDURE — 1123F ACP DISCUSS/DSCN MKR DOCD: CPT | Performed by: INTERNAL MEDICINE

## 2025-02-03 PROCEDURE — 1159F MED LIST DOCD IN RCRD: CPT | Performed by: INTERNAL MEDICINE

## 2025-02-03 PROCEDURE — 99214 OFFICE O/P EST MOD 30 MIN: CPT | Performed by: INTERNAL MEDICINE

## 2025-02-03 ASSESSMENT — ENCOUNTER SYMPTOMS
RECTAL PAIN: 0
BLOOD IN STOOL: 0
COUGH: 0
ABDOMINAL PAIN: 0
TROUBLE SWALLOWING: 0
SORE THROAT: 0
ABDOMINAL DISTENTION: 0
VOMITING: 0
CHOKING: 0
NAUSEA: 0
ANAL BLEEDING: 0
DIARRHEA: 1
CONSTIPATION: 0
SHORTNESS OF BREATH: 0
COLOR CHANGE: 0
WHEEZING: 0

## 2025-02-03 NOTE — PROGRESS NOTES
GI CLINIC FOLLOW UP    NTERVAL HISTORY:   No referring provider defined for this encounter.    Chief Complaint   Patient presents with    GI Problem     Patient is here today due to bowel issues, previous Dr Jean Baptiste pt last seen on 2/16/24 for diverticulosis, & tubular adenoma of colon.       1. Tubular adenoma of colon    2. Diverticulosis    3. Polyp of colon, unspecified part of colon, unspecified type    4. Incontinence of feces with fecal urgency      This patient is seen in my office for the first time however he has been followed by Dr Jean Baptiste and had Colonoscopy done in past with T adenomas and diverticulosis    He has some issues with abdominal discomfort and pain    His primary issue seems to be some incontinence of the stool associated with some urgency and off-and-on liquid stools    Has been seeing some weekend with her GI    Some abdominal bloating gas cramping    Has history for diverticulosis    previous records were reviewed with him    HISTORY OF PRESENT ILLNESS: Mr.James Grayson Padron is a 74 y.o. male with a past history remarkable for , referred for evaluation of   Chief Complaint   Patient presents with    GI Problem     Patient is here today due to bowel issues, previous Dr Jean Baptiste pt last seen on 2/16/24 for diverticulosis, & tubular adenoma of colon.   .    Past Medical,Family, and Social History reviewed and does contribute to the patient presenting condition.    Patient's PMH/PSH,SH,PSYCH Hx, MEDs, ALLERGIES, and ROS were all reviewed and updated in the appropriate sections.    PAST MEDICAL HISTORY:  Past Medical History:   Diagnosis Date    Adrenal nodule (HCC)     Asthma     MILD    BPH with urinary obstruction     Cancer (HCC)     lung cancer-minimally invasive mucinous adenocarcinoma    Chronic bronchitis (HCC)     MILD    COPD (chronic obstructive pulmonary disease) (HCC)     MILD    Diverticulosis     GERD (gastroesophageal reflux disease)     [place] (hard of hearing)

## 2025-02-06 DIAGNOSIS — N39.41 URGE INCONTINENCE: ICD-10-CM

## 2025-02-06 DIAGNOSIS — N40.1 BENIGN PROSTATIC HYPERPLASIA WITH LOWER URINARY TRACT SYMPTOMS: ICD-10-CM

## 2025-02-06 RX ORDER — OXYBUTYNIN CHLORIDE 15 MG/1
15 TABLET, EXTENDED RELEASE ORAL DAILY
Qty: 90 TABLET | Refills: 3 | Status: SHIPPED | OUTPATIENT
Start: 2025-02-06

## 2025-02-06 NOTE — TELEPHONE ENCOUNTER
Last seen 4/16/24  Plan:     BPH is chronic and stable.  Overactive bladder is chronic and stable.  Will send an order for Gemtesa.  If the medication is too expensive, will switch back to oxybutynin.  PSA is normal.  Repeat in 1 year with a SULY at that time.  Follow-up in 1 year or sooner if needed.     Return in about 1 year (around 4/16/2025) for bph and oab with psa/SULY.    Next OV 4/16/25

## 2025-04-14 NOTE — PROGRESS NOTES
[facility] PHYSICIANS [facility], [facility] UROLOGY CENTER  [address]  [facility] [address]  Dept: [phone]    [facility] Urology Office Note - Established    Patient:  Heriberto Padron  YOB: 1950  Date: 4/16/2025    The patient is a 74 y.o. male who presents todayfor evaluation of the following problems:   Chief Complaint   Patient presents with    Benign Prostatic Hypertrophy     1yr fu -for bph and oab with psa/SULY       HPI  The patient presents today for  yearly follow up, hx bph and OAB.   He had GLL in 2023 with Dr. Lynch. No longer on flomax.  Recently the urinary symptoms are: show no change  Current medical Rx for BPH/LUTS: oxybutynin 15mg po qd.   Voiding with moderate stream, feels he empties well.  Some urgency from time to time, very seldom leakage.   Denies frequency or nocturia.   Last PSA: 0.6 (11/2024), had negative bx in 2020.  His last Digital Rectal Exam was 9/2023, due now.      Summary of old records: N/A    Additional History: N/A    Procedures Today: N/A    Urinalysis today:  No results found for this visit on 04/16/25.  Last several PSA's:  Lab Results   Component Value Date    PSA 0.60 11/04/2024    PSA 0.70 05/02/2024    PSA 0.70 04/15/2024     Last total testosterone:  Lab Results   Component Value Date    TESTOSTERONE 367 07/22/2013     Last BUN and creatinine:  Lab Results   Component Value Date    BUN 19 11/04/2024     Lab Results   Component Value Date    CREATININE 0.7 11/04/2024       Additional Lab/Culture results: none    Imaging Reviewed during this Office Visit: none  (results were independently reviewed by physician and radiology report verified)    PAST MEDICAL, FAMILY AND SOCIAL HISTORY UPDATE:  Past Medical History:   Diagnosis Date    Adrenal nodule     Asthma     MILD    BPH with urinary obstruction     Cancer (HCC)     lung cancer-minimally invasive mucinous adenocarcinoma    Chronic bronchitis (HCC)     MILD

## 2025-04-16 ENCOUNTER — OFFICE VISIT (OUTPATIENT)
Dept: UROLOGY | Age: 75
End: 2025-04-16
Payer: MEDICARE

## 2025-04-16 VITALS
TEMPERATURE: 97.5 F | HEART RATE: 57 BPM | WEIGHT: 205.6 LBS | SYSTOLIC BLOOD PRESSURE: 128 MMHG | DIASTOLIC BLOOD PRESSURE: 54 MMHG | BODY MASS INDEX: 30.36 KG/M2

## 2025-04-16 DIAGNOSIS — N39.41 URGE INCONTINENCE: ICD-10-CM

## 2025-04-16 DIAGNOSIS — N40.1 BENIGN PROSTATIC HYPERPLASIA (BPH) WITH URINARY URGENCY: Primary | ICD-10-CM

## 2025-04-16 DIAGNOSIS — Z12.5 PROSTATE CANCER SCREENING: ICD-10-CM

## 2025-04-16 DIAGNOSIS — R39.15 BENIGN PROSTATIC HYPERPLASIA (BPH) WITH URINARY URGENCY: Primary | ICD-10-CM

## 2025-04-16 PROCEDURE — 99213 OFFICE O/P EST LOW 20 MIN: CPT | Performed by: NURSE PRACTITIONER

## 2025-04-16 PROCEDURE — 3078F DIAST BP <80 MM HG: CPT | Performed by: NURSE PRACTITIONER

## 2025-04-16 PROCEDURE — 1123F ACP DISCUSS/DSCN MKR DOCD: CPT | Performed by: NURSE PRACTITIONER

## 2025-04-16 PROCEDURE — 1159F MED LIST DOCD IN RCRD: CPT | Performed by: NURSE PRACTITIONER

## 2025-04-16 PROCEDURE — 3074F SYST BP LT 130 MM HG: CPT | Performed by: NURSE PRACTITIONER

## 2025-04-16 RX ORDER — OXYBUTYNIN CHLORIDE 15 MG/1
15 TABLET, EXTENDED RELEASE ORAL DAILY
Qty: 90 TABLET | Refills: 3 | Status: SHIPPED | OUTPATIENT
Start: 2025-04-16

## 2025-04-16 ASSESSMENT — ENCOUNTER SYMPTOMS
DIARRHEA: 0
CONSTIPATION: 0
EYE REDNESS: 0
NAUSEA: 0
SHORTNESS OF BREATH: 0
WHEEZING: 0
ABDOMINAL PAIN: 0
COUGH: 0
EYE PAIN: 0
VOMITING: 0
BACK PAIN: 0

## 2025-04-16 NOTE — PROGRESS NOTES
Review of Systems   Constitutional:  Negative for chills, fatigue and fever.   Eyes:  Negative for pain, redness and visual disturbance.   Respiratory:  Negative for cough, shortness of breath and wheezing.    Cardiovascular:  Negative for chest pain and leg swelling.   Gastrointestinal:  Negative for abdominal pain, constipation, diarrhea, nausea and vomiting.   Genitourinary:  Negative for difficulty urinating, dysuria, flank pain, frequency, hematuria, scrotal swelling, testicular pain and urgency.   Musculoskeletal:  Negative for back pain, joint swelling and myalgias.   Skin:  Negative for rash and wound.   Neurological:  Negative for dizziness, tremors, weakness and numbness.   Hematological:  Does not bruise/bleed easily.

## 2025-04-18 ENCOUNTER — TELEPHONE (OUTPATIENT)
Dept: GASTROENTEROLOGY | Age: 75
End: 2025-04-18

## 2025-04-28 PROBLEM — N13.8 BENIGN PROSTATIC HYPERPLASIA WITH URINARY OBSTRUCTION: Status: RESOLVED | Noted: 2020-03-03 | Resolved: 2025-04-28

## 2025-04-28 PROBLEM — N40.1 BENIGN PROSTATIC HYPERPLASIA WITH URINARY OBSTRUCTION: Status: RESOLVED | Noted: 2020-03-03 | Resolved: 2025-04-28

## 2025-05-09 ENCOUNTER — HOSPITAL ENCOUNTER (OUTPATIENT)
Dept: CT IMAGING | Age: 75
Discharge: HOME | End: 2025-05-11
Attending: FAMILY MEDICINE
Payer: MEDICARE

## 2025-05-09 ENCOUNTER — HOSPITAL ENCOUNTER (OUTPATIENT)
Dept: PULMONOLOGY | Age: 75
Discharge: HOME | End: 2025-05-09
Attending: FAMILY MEDICINE
Payer: MEDICARE

## 2025-05-09 ENCOUNTER — OFFICE VISIT (OUTPATIENT)
Dept: PODIATRY | Age: 75
End: 2025-05-09
Payer: MEDICARE

## 2025-05-09 VITALS — HEIGHT: 69 IN | WEIGHT: 200 LBS | BODY MASS INDEX: 29.62 KG/M2

## 2025-05-09 VITALS — HEIGHT: 69 IN | BODY MASS INDEX: 29.62 KG/M2 | WEIGHT: 200 LBS

## 2025-05-09 DIAGNOSIS — B35.1 ONYCHOMYCOSIS OF TOENAIL: Primary | ICD-10-CM

## 2025-05-09 DIAGNOSIS — Z87.891 PERSONAL HISTORY OF TOBACCO USE: ICD-10-CM

## 2025-05-09 DIAGNOSIS — R06.02 SOB (SHORTNESS OF BREATH) ON EXERTION: ICD-10-CM

## 2025-05-09 DIAGNOSIS — M79.675 PAIN OF TOES OF BOTH FEET: ICD-10-CM

## 2025-05-09 DIAGNOSIS — M79.674 PAIN OF TOES OF BOTH FEET: ICD-10-CM

## 2025-05-09 LAB
DLCO %PRED: NORMAL
DLCO PRED: NORMAL
DLCO/VA %PRED: NORMAL
DLCO/VA PRED: NORMAL
DLCO/VA: NORMAL
DLCO: NORMAL
EXPIRATORY TIME: NORMAL
FEF 25-75% %PRED-PRE: NORMAL
FEF 25-75% PRED: NORMAL
FEF 25-75-PRE: NORMAL
FEV1 %PRED-PRE: NORMAL
FEV1 PRED: NORMAL
FEV1/FVC %PRED-PRE: NORMAL
FEV1/FVC PRED: NORMAL
FEV1/FVC: NORMAL
FEV1: NORMAL
FVC %PRED-PRE: NORMAL
FVC PRED: NORMAL
FVC: NORMAL
GAW %PRED: NORMAL
GAW PRED: NORMAL
GAW: NORMAL
IC PRE %PRED: NORMAL
IC PRED: NORMAL
IC: NORMAL
MVV %PRED-PRE: NORMAL
MVV PRED: NORMAL
MVV-PRE: NORMAL
PEF %PRED-PRE: NORMAL
PEF PRED: NORMAL
PEF-PRE: NORMAL
RAW %PRED: NORMAL
RAW PRED: NORMAL
RAW: NORMAL
RV PRE %PRED: NORMAL
RV PRED: NORMAL
RV: NORMAL
SVC %PRED: NORMAL
SVC PRED: NORMAL
SVC: NORMAL
TLC PRE %PRED: NORMAL
TLC PRED: NORMAL
TLC: NORMAL
VA %PRED: NORMAL
VA PRED: NORMAL
VA: NORMAL
VTG %PRED: NORMAL
VTG PRED: NORMAL
VTG: NORMAL

## 2025-05-09 PROCEDURE — 94664 DEMO&/EVAL PT USE INHALER: CPT

## 2025-05-09 PROCEDURE — 1125F AMNT PAIN NOTED PAIN PRSNT: CPT | Performed by: PODIATRIST

## 2025-05-09 PROCEDURE — 6370000000 HC RX 637 (ALT 250 FOR IP): Performed by: FAMILY MEDICINE

## 2025-05-09 PROCEDURE — 99203 OFFICE O/P NEW LOW 30 MIN: CPT | Performed by: PODIATRIST

## 2025-05-09 PROCEDURE — 71271 CT THORAX LUNG CANCER SCR C-: CPT

## 2025-05-09 PROCEDURE — 1123F ACP DISCUSS/DSCN MKR DOCD: CPT | Performed by: PODIATRIST

## 2025-05-09 PROCEDURE — 94726 PLETHYSMOGRAPHY LUNG VOLUMES: CPT

## 2025-05-09 PROCEDURE — 11721 DEBRIDE NAIL 6 OR MORE: CPT | Performed by: PODIATRIST

## 2025-05-09 PROCEDURE — 1159F MED LIST DOCD IN RCRD: CPT | Performed by: PODIATRIST

## 2025-05-09 PROCEDURE — 94060 EVALUATION OF WHEEZING: CPT

## 2025-05-09 PROCEDURE — 94729 DIFFUSING CAPACITY: CPT

## 2025-05-09 RX ORDER — ALBUTEROL SULFATE 90 UG/1
2 INHALANT RESPIRATORY (INHALATION) ONCE
Status: COMPLETED | OUTPATIENT
Start: 2025-05-09 | End: 2025-05-09

## 2025-05-09 RX ADMIN — ALBUTEROL SULFATE 2 PUFF: 90 AEROSOL, METERED RESPIRATORY (INHALATION) at 09:37

## 2025-05-09 NOTE — PROCEDURES
PFT Interpretation:    Obstructive Ventilatory defect of MODERATE severity is noted.  Evidence of air trapping Is noted.  Diffusion Capacity is reduced.   NO Significant improvement is noted lung mechanics after bronchodilators.      Linda Bonilla MD

## 2025-05-09 NOTE — PROGRESS NOTES
Heriberto Padron is a 74 y.o. male who presents to the office today with chief complaint of thick painful toenails to both feet.  Chief Complaint   Patient presents with    Nail Problem     B/l nail trim, last seen Angel Mccabe MD 4/28/25   Symptoms began greater than 1 year(s) ago. Patient denies injury to the feet. Patient states that his toenails are painful with shoe gear and ambulation. Pain is rated 5 out of 10 at it's worst and is described as intermittent. Treatments prior to today's visit include: None.      Allergies   Allergen Reactions    Tetanus Toxoids Other (See Comments)     Fever, sweating and red streak down the arm from site of injection    Statins      Elevated LFT (3x normal)    Tetanus Immune Globulin        Past Medical History:   Diagnosis Date    Adrenal nodule     Asthma     MILD    BPH with urinary obstruction     Cancer (HCC)     lung cancer-minimally invasive mucinous adenocarcinoma    Chronic bronchitis (HCC)     MILD    COPD (chronic obstructive pulmonary disease) (HCC)     MILD    Diverticulosis     GERD (gastroesophageal reflux disease)     [place] (hard of hearing)     Hyperlipidemia     Hypertension     PCP DR ANGEL MCCABE    SASCHA on CPAP     Pulmonary nodule     PVC's (premature ventricular contractions)     Retention of urine     after lung surgery    Snores     denies apnea    Vitiligo     Wears glasses     READING    Wears partial dentures     LOWER       Prior to Admission medications    Medication Sig Start Date End Date Taking? Authorizing Provider   oxyBUTYnin (DITROPAN XL) 15 MG extended release tablet Take 1 tablet by mouth daily 4/16/25  Yes Shasha Keen, APRN - CNP   losartan (COZAAR) 100 MG tablet Take 1 tablet by mouth daily TAKE 1 TABLET BY MOUTH DAILY (TAKE WITH HYDROCHLOROTHIAZIDE 12.5 MG TABLETS) 2/28/25  Yes Angel Mccabe MD   albuterol sulfate HFA (PROVENTIL;VENTOLIN;PROAIR) 108 (90 Base) MCG/ACT inhaler  3/9/23  Yes Provider, MD Radha

## 2025-05-12 ENCOUNTER — OFFICE VISIT (OUTPATIENT)
Dept: UROLOGY | Age: 75
End: 2025-05-12
Payer: MEDICARE

## 2025-05-12 ENCOUNTER — HOSPITAL ENCOUNTER (OUTPATIENT)
Age: 75
Setting detail: SPECIMEN
Discharge: HOME | End: 2025-05-12

## 2025-05-12 VITALS — TEMPERATURE: 97.6 F | DIASTOLIC BLOOD PRESSURE: 44 MMHG | SYSTOLIC BLOOD PRESSURE: 120 MMHG | HEART RATE: 81 BPM

## 2025-05-12 DIAGNOSIS — R31.0 GROSS HEMATURIA: ICD-10-CM

## 2025-05-12 DIAGNOSIS — Z87.891 FORMER SMOKER: ICD-10-CM

## 2025-05-12 DIAGNOSIS — R35.0 URINARY FREQUENCY: ICD-10-CM

## 2025-05-12 DIAGNOSIS — R31.0 GROSS HEMATURIA: Primary | ICD-10-CM

## 2025-05-12 PROCEDURE — 3078F DIAST BP <80 MM HG: CPT | Performed by: NURSE PRACTITIONER

## 2025-05-12 PROCEDURE — 1159F MED LIST DOCD IN RCRD: CPT | Performed by: NURSE PRACTITIONER

## 2025-05-12 PROCEDURE — 1123F ACP DISCUSS/DSCN MKR DOCD: CPT | Performed by: NURSE PRACTITIONER

## 2025-05-12 PROCEDURE — 3074F SYST BP LT 130 MM HG: CPT | Performed by: NURSE PRACTITIONER

## 2025-05-12 PROCEDURE — 99214 OFFICE O/P EST MOD 30 MIN: CPT | Performed by: NURSE PRACTITIONER

## 2025-05-12 ASSESSMENT — ENCOUNTER SYMPTOMS
SHORTNESS OF BREATH: 0
EYE REDNESS: 0
DIARRHEA: 0
ABDOMINAL PAIN: 0
SHORTNESS OF BREATH: 0
EYE PAIN: 0
DIARRHEA: 0
COUGH: 0
NAUSEA: 0
BACK PAIN: 0
CONSTIPATION: 0
WHEEZING: 0
VOMITING: 0
NAUSEA: 0
COLOR CHANGE: 0
BACK PAIN: 0

## 2025-05-12 NOTE — PROGRESS NOTES
[facility] PHYSICIANS [facility], [facility] UROLOGY CENTER  [address]  [facility] [address]  Dept: [phone]    [facility] Urology Office Note - Established    Patient:  Heriberto Padron  YOB: 1950  Date: 5/12/2025    The patient is a 74 y.o. male who presents todayfor evaluation of the following problems:   Chief Complaint   Patient presents with    Follow-up     Passing blood cots       HPI  Patient is a 73 yo male with hx of BPH (s/p GLL 2023) and OAB  (on oxybutynin) who presents with gross hematuria which occurred 2 day(s) ago.   Since last office visit the patient's gross hematuria is: gone.  He does not take anticoagulants.  Previous imaging results:  none  Previous cystoscopy? yes - 2/2023  Urinary cytology/FISH : not done   Lower urinary tract symptoms: frequency (baseline)  Former smoker.  Denies personal/fhx bladder or kidney cancer.   Denies stone hx, no flank pain or uti symptoms  Last PSA 11/2024- 0.6    Summary of old records: N/A    Additional History: N/A    Procedures Today: N/A    Urinalysis today:  No results found for this visit on 05/12/25.  Last several PSA's:  Lab Results   Component Value Date    PSA 0.60 11/04/2024    PSA 0.70 05/02/2024    PSA 0.70 04/15/2024     Last total testosterone:  Lab Results   Component Value Date    TESTOSTERONE 367 07/22/2013         Last BUN and creatinine:  Lab Results   Component Value Date    BUN 19 11/04/2024     Lab Results   Component Value Date    CREATININE 0.7 11/04/2024       Additional Lab/Culture results: none    Imaging Reviewed during this Office Visit: none  (results were independently reviewed by physician and radiology report verified)    PAST MEDICAL, FAMILY AND SOCIAL HISTORY UPDATE:  Past Medical History:   Diagnosis Date    Adrenal nodule     Asthma     MILD    BPH with urinary obstruction     Cancer (HCC)     lung cancer-minimally invasive mucinous adenocarcinoma    Chronic

## 2025-05-12 NOTE — PROGRESS NOTES
Review of Systems   Constitutional:  Negative for chills, fatigue and fever.   Eyes:  Negative for pain, redness and visual disturbance.   Respiratory:  Negative for cough, shortness of breath and wheezing.    Cardiovascular:  Negative for chest pain and leg swelling.   Gastrointestinal:  Negative for abdominal pain, constipation, diarrhea, nausea and vomiting.   Genitourinary:  Positive for frequency. Negative for difficulty urinating, dysuria, flank pain, hematuria, scrotal swelling, testicular pain and urgency.   Musculoskeletal:  Negative for back pain, joint swelling and myalgias.   Skin:  Negative for rash and wound.   Neurological:  Negative for dizziness, tremors, weakness and numbness.   Hematological:  Does not bruise/bleed easily.

## 2025-05-13 LAB
MICROORGANISM SPEC CULT: NO GROWTH
SERVICE CMNT-IMP: NORMAL
SPECIMEN DESCRIPTION: NORMAL

## 2025-05-14 ENCOUNTER — RESULTS FOLLOW-UP (OUTPATIENT)
Dept: UROLOGY | Age: 75
End: 2025-05-14

## 2025-05-16 ENCOUNTER — HOSPITAL ENCOUNTER (OUTPATIENT)
Dept: CT IMAGING | Age: 75
Discharge: HOME | End: 2025-05-18
Payer: MEDICARE

## 2025-05-16 DIAGNOSIS — R31.0 GROSS HEMATURIA: ICD-10-CM

## 2025-05-16 LAB
EGFR, POC: >90 ML/MIN/1.73M2
POC CREATININE: 0.7 MG/DL (ref 0.51–1.19)

## 2025-05-16 PROCEDURE — 82565 ASSAY OF CREATININE: CPT

## 2025-05-16 PROCEDURE — 2580000003 HC RX 258: Performed by: NURSE PRACTITIONER

## 2025-05-16 PROCEDURE — 6360000004 HC RX CONTRAST MEDICATION: Performed by: NURSE PRACTITIONER

## 2025-05-16 PROCEDURE — 74178 CT ABD&PLV WO CNTR FLWD CNTR: CPT | Performed by: NURSE PRACTITIONER

## 2025-05-16 PROCEDURE — 2500000003 HC RX 250 WO HCPCS: Performed by: NURSE PRACTITIONER

## 2025-05-16 RX ORDER — 0.9 % SODIUM CHLORIDE 0.9 %
100 INTRAVENOUS SOLUTION INTRAVENOUS ONCE
Status: COMPLETED | OUTPATIENT
Start: 2025-05-16 | End: 2025-05-16

## 2025-05-16 RX ORDER — IOPAMIDOL 755 MG/ML
120 INJECTION, SOLUTION INTRAVASCULAR
Status: COMPLETED | OUTPATIENT
Start: 2025-05-16 | End: 2025-05-16

## 2025-05-16 RX ORDER — SODIUM CHLORIDE 0.9 % (FLUSH) 0.9 %
10 SYRINGE (ML) INJECTION PRN
Status: DISCONTINUED | OUTPATIENT
Start: 2025-05-16 | End: 2025-05-19 | Stop reason: HOSPADM

## 2025-05-16 RX ADMIN — SODIUM CHLORIDE, PRESERVATIVE FREE 10 ML: 5 INJECTION INTRAVENOUS at 07:45

## 2025-05-16 RX ADMIN — SODIUM CHLORIDE 100 ML: 9 INJECTION, SOLUTION INTRAVENOUS at 07:45

## 2025-05-16 RX ADMIN — IOPAMIDOL 120 ML: 755 INJECTION, SOLUTION INTRAVENOUS at 07:45

## 2025-05-19 PROBLEM — J44.9 MODERATE COPD (CHRONIC OBSTRUCTIVE PULMONARY DISEASE) (HCC): Status: ACTIVE | Noted: 2025-05-19

## 2025-05-19 PROBLEM — J44.9 MILD CHRONIC OBSTRUCTIVE PULMONARY DISEASE (HCC): Status: RESOLVED | Noted: 2021-03-02 | Resolved: 2025-05-19

## 2025-05-29 ENCOUNTER — HOSPITAL ENCOUNTER (OUTPATIENT)
Age: 75
Setting detail: SPECIMEN
Discharge: HOME | End: 2025-05-29

## 2025-05-29 ENCOUNTER — PROCEDURE VISIT (OUTPATIENT)
Dept: UROLOGY | Age: 75
End: 2025-05-29
Payer: MEDICARE

## 2025-05-29 DIAGNOSIS — R31.0 GROSS HEMATURIA: Primary | ICD-10-CM

## 2025-05-29 DIAGNOSIS — R31.0 GROSS HEMATURIA: ICD-10-CM

## 2025-05-29 PROCEDURE — 52000 CYSTOURETHROSCOPY: CPT | Performed by: UROLOGY

## 2025-05-29 NOTE — PROGRESS NOTES
Cystoscopy Operative Note (5/29/25)  Surgeon: Dmitriy Lynch MD  Anesthesia: Urethral 2% Xylocaine   Indications: Gross hematuria   Position: Dorsal Lithotomy    Findings:   Risks and Benefits discussed with patient prior to procedure.  The patient was prepped and draped in the usual sterile fashion.  The flexible cystoscope was advanced through the urethra and into the bladder.  The bladder was thoroughly inspected and the following was noted:    Residual Urine: mild  Urethra: normal appearing urethra with no masses, tenderness or lesions  Prostate: partially obstructing lateral lobes of prostate; median lobe present? no.   Bladder: No tumors or CIS noted.  No bladder diverticulum.  There was mild trabeculation noted.  Ureters: Clear efflux from both ureters.  Orifices with normal configuration and location.    The cystoscope was removed.  The patient tolerated the procedure well.     Agree with the ROS entered by the MA.

## 2025-06-03 LAB — UROTHELIAL CANCER DETECTION: NORMAL

## 2025-07-29 ENCOUNTER — OFFICE VISIT (OUTPATIENT)
Dept: GASTROENTEROLOGY | Age: 75
End: 2025-07-29
Payer: MEDICARE

## 2025-07-29 VITALS
WEIGHT: 185 LBS | DIASTOLIC BLOOD PRESSURE: 67 MMHG | SYSTOLIC BLOOD PRESSURE: 116 MMHG | BODY MASS INDEX: 27.32 KG/M2 | TEMPERATURE: 97.4 F

## 2025-07-29 DIAGNOSIS — R15.9 INCONTINENCE OF FECES WITH FECAL URGENCY: ICD-10-CM

## 2025-07-29 DIAGNOSIS — D12.6 TUBULAR ADENOMA OF COLON: Primary | ICD-10-CM

## 2025-07-29 DIAGNOSIS — K63.5 POLYP OF COLON, UNSPECIFIED PART OF COLON, UNSPECIFIED TYPE: ICD-10-CM

## 2025-07-29 DIAGNOSIS — R15.2 INCONTINENCE OF FECES WITH FECAL URGENCY: ICD-10-CM

## 2025-07-29 DIAGNOSIS — K57.90 DIVERTICULOSIS: ICD-10-CM

## 2025-07-29 PROCEDURE — 99214 OFFICE O/P EST MOD 30 MIN: CPT | Performed by: INTERNAL MEDICINE

## 2025-07-29 PROCEDURE — 1123F ACP DISCUSS/DSCN MKR DOCD: CPT | Performed by: INTERNAL MEDICINE

## 2025-07-29 PROCEDURE — 3074F SYST BP LT 130 MM HG: CPT | Performed by: INTERNAL MEDICINE

## 2025-07-29 PROCEDURE — 1159F MED LIST DOCD IN RCRD: CPT | Performed by: INTERNAL MEDICINE

## 2025-07-29 PROCEDURE — 3078F DIAST BP <80 MM HG: CPT | Performed by: INTERNAL MEDICINE

## 2025-07-29 ASSESSMENT — ENCOUNTER SYMPTOMS
ANAL BLEEDING: 0
SHORTNESS OF BREATH: 0
RECTAL PAIN: 0
SORE THROAT: 0
TROUBLE SWALLOWING: 0
COUGH: 0
BLOOD IN STOOL: 0
CONSTIPATION: 0
COLOR CHANGE: 0
WHEEZING: 0
ABDOMINAL PAIN: 0
CHOKING: 0
ABDOMINAL DISTENTION: 0
VOMITING: 0
DIARRHEA: 0
NAUSEA: 0

## 2025-07-29 NOTE — PROGRESS NOTES
GI CLINIC FOLLOW UP    NTERVAL HISTORY:   Jessica Diaz MD  [address]   [address]  [place] [address]    Chief Complaint   Patient presents with    Diverticulosis     Patient is here today for a f/u last seen on 2/3/25 for diverticulosis, incontinence of feces, & colon polyps.       1. Tubular adenoma of colon    2. Diverticulosis    3. Polyp of colon, unspecified part of colon, unspecified type    4. Incontinence of feces with fecal urgency      This patient seen my office as a follow-up for the second time in the past she has been followed by my     He had some issues with incontinence    He was prescribed bulking agents pelvic floor exercises he is taking Metamucil and remarkably feeling well    Has no leakage of the stools anymore he is    He is active losing some weight as well this summer    Avoiding the processed food as    Had colonoscopy done by my  in the past    He denies any bleeding denies any melena    Records were reviewed with him        HISTORY OF PRESENT ILLNESS: Mr.James Grayson Padron is a 74 y.o. male with a past history remarkable for , referred for evaluation of   Chief Complaint   Patient presents with    Diverticulosis     Patient is here today for a f/u last seen on 2/3/25 for diverticulosis, incontinence of feces, & colon polyps.   .    Past Medical,Family, and Social History reviewed and does contribute to the patient presenting condition.    Patient's PMH/PSH,SH,PSYCH Hx, MEDs, ALLERGIES, and ROS were all reviewed and updated in the appropriate sections.    PAST MEDICAL HISTORY:  Past Medical History:   Diagnosis Date    Adrenal nodule     Asthma     MILD    BPH with urinary obstruction     Cancer (HCC)     lung cancer-minimally invasive mucinous adenocarcinoma    Chronic bronchitis (HCC)     MILD    COPD (chronic obstructive pulmonary disease) (HCC)     MILD    Diverticulosis     GERD (gastroesophageal reflux disease)     [place] (hard of

## (undated) DEVICE — STRAP,CATHETER,ELASTIC,HOOK&LOOP: Brand: MEDLINE

## (undated) DEVICE — CATHETER URETH 22FR BLLN 30CC 3 W F SPEC INF CTRL BARDX

## (undated) DEVICE — GLOVE ORTHO 7 1/2   MSG9475

## (undated) DEVICE — SET ADMIN 25ML L117IN PMP MOD CK VLV RLER CLMP 2 SMRTSITE

## (undated) DEVICE — Z DISCONTINUED USE 2424143 ADAPTER O2 SWVL CHRISTMAS TREE GRN

## (undated) DEVICE — DEFENDO AIR WATER SUCTION AND BIOPSY VALVE KIT FOR  OLYMPUS: Brand: DEFENDO AIR/WATER/SUCTION AND BIOPSY VALVE

## (undated) DEVICE — SOLUTION IRRIG 3000ML 0.9% SOD CHL USP UROMATIC PLAS CONT

## (undated) DEVICE — LASER SURG W22XH58IN D36IN 475LB SLD STATE FREQ DOUBLED

## (undated) DEVICE — GLOVE ORANGE PI 7 1/2   MSG9075

## (undated) DEVICE — SNARE ENDOSCP AD L240CM LOOP W10MM SHTH DIA2.4MM RND INSUL

## (undated) DEVICE — POLYP TRAP: Brand: TRAPEASE®

## (undated) DEVICE — DRAINBAG,ANTI-REFLUX TOWER,L/F,2000ML,LL: Brand: MEDLINE

## (undated) DEVICE — ENDO KIT W/SYRINGE: Brand: MEDLINE INDUSTRIES, INC.

## (undated) DEVICE — ST CHARLES CYSTO PACK: Brand: MEDLINE INDUSTRIES, INC.

## (undated) DEVICE — FORCEPS BX L240CM WRK CHN 2.8MM STD CAP W/ NDL MIC MESH

## (undated) DEVICE — MONOPTY® DISPOSABLE CORE BIOPSY INSTRUMENT, 22MM PENETRATION DEPTH, 18G X 20CM: Brand: MONOPTY

## (undated) DEVICE — SYRINGE MED 30ML STD CLR PLAS LUERLOCK TIP N CTRL DISP

## (undated) DEVICE — GLOVE ORANGE PI 7   MSG9070

## (undated) DEVICE — AIRLIFE™ OXYGEN TUBING 7 FEET (2.1 M) CRUSH RESISTANT OXYGEN TUBING, VINYL TIPPED: Brand: AIRLIFE™

## (undated) DEVICE — SYRINGE,PISTON,IRRIGATION,60ML,STERILE: Brand: MEDLINE

## (undated) DEVICE — GOWN,POLY REINFORCED,LG: Brand: MEDLINE

## (undated) DEVICE — JELLY,LUBE,STERILE,FLIP TOP,TUBE,2-OZ: Brand: MEDLINE

## (undated) DEVICE — SOLUTION IRRIG 1000ML STRL H2O USP PLAS POUR BTL

## (undated) DEVICE — ERBE NESSY® OMEGA PLATE USA (85+23)CM² , WITH CABLE 3 M: Brand: ERBE

## (undated) DEVICE — Z INACTIVE USE 2525529 CONNECTOR TBNG FOR O2

## (undated) DEVICE — Y-TYPE TUR/BLADDER IRRIGATION SET, REGULATING CLAMP

## (undated) DEVICE — CANNULA NSL AD TBNG L7FT PVC STR NONFLARED PRNG O2 DEL W STD

## (undated) DEVICE — RENTAL LASER XPS CASE